# Patient Record
Sex: MALE | Race: WHITE | HISPANIC OR LATINO | Employment: FULL TIME | URBAN - METROPOLITAN AREA
[De-identification: names, ages, dates, MRNs, and addresses within clinical notes are randomized per-mention and may not be internally consistent; named-entity substitution may affect disease eponyms.]

---

## 2017-01-10 ENCOUNTER — OFFICE VISIT (OUTPATIENT)
Dept: NEUROLOGY | Facility: MEDICAL CENTER | Age: 52
End: 2017-01-10
Payer: COMMERCIAL

## 2017-01-10 VITALS
TEMPERATURE: 97.3 F | OXYGEN SATURATION: 98 % | WEIGHT: 150 LBS | HEIGHT: 65 IN | DIASTOLIC BLOOD PRESSURE: 78 MMHG | SYSTOLIC BLOOD PRESSURE: 104 MMHG | BODY MASS INDEX: 24.99 KG/M2 | HEART RATE: 74 BPM

## 2017-01-10 DIAGNOSIS — R51.9 CHRONIC DAILY HEADACHE: Primary | ICD-10-CM

## 2017-01-10 DIAGNOSIS — M54.2 NECK PAIN: ICD-10-CM

## 2017-01-10 PROCEDURE — 99205 OFFICE O/P NEW HI 60 MIN: CPT | Performed by: PHYSICIAN ASSISTANT

## 2017-01-10 RX ORDER — RIZATRIPTAN BENZOATE 10 MG/1
10 TABLET ORAL
Qty: 10 TAB | Refills: 3 | Status: SHIPPED | OUTPATIENT
Start: 2017-01-10 | End: 2022-08-05

## 2017-01-10 RX ORDER — PROMETHAZINE HYDROCHLORIDE 25 MG/1
TABLET ORAL
Qty: 20 TAB | Refills: 12 | Status: SHIPPED | OUTPATIENT
Start: 2017-01-10 | End: 2022-08-05

## 2017-01-10 RX ORDER — AMITRIPTYLINE HYDROCHLORIDE 10 MG/1
TABLET, FILM COATED ORAL
Qty: 100 TAB | Refills: 3 | Status: SHIPPED | OUTPATIENT
Start: 2017-01-10 | End: 2022-08-05

## 2017-01-10 RX ORDER — CYCLOBENZAPRINE HCL 10 MG
10 TABLET ORAL 3 TIMES DAILY PRN
Qty: 30 TAB | Refills: 0 | Status: SHIPPED | OUTPATIENT
Start: 2017-01-10 | End: 2022-08-05

## 2017-01-10 NOTE — PROGRESS NOTES
Subjective:      Mario Esquivel is a 51 y.o. male who presents with New Patient    Referring Physician  Elba Maddox MD    Chief Complaint/Reason for referral:  headaches    History of Present Illness:   Describe your headaches to me:  Through interpretor services - for a long time, especially at night when sleeping, he feels tightness in his neck. Wakes him up in the middle of the night with pain.  He describes pain as severe at times affecting his ability to work at times.  And then gives long tangential answer.  He was asked again to categorize his pain - sometimes the pain is mild and sometimes it is severe.  15-20 years history of headaches - he is coming to the doctor now because OTC medicines don't work as well anymore - he has to take a lot more of them.  Also ibuprofen is bothering his stomach now.    Extremely difficult to obtain history and patient seems incapable of simply answering questions without becoming extremely tangential.  Even with given options of 3 or 4 choices, he cannot say, for instance whether his headache is dull or pulsatile.  Sounds like it is mostly a throbbing headache, however.    Location of headaches:?  Sides of head and sometimes sides of head and shoulders and head.  Duration of headaches?  All day vs several hours pain, depending upon whether he takes medicines  When did headaches start or why do you think you started having headaches?  Have your headaches started recently or changed recently?     Do you get an aura or any symptoms that typically begin PRIOR to headache onset?  no    Are you nauseated or sick to your stomach when you have a headache?  yes  Does light bother you when you have a headache?   ___yes______  Does sound or noise bother you when you have a headache?   _____yes____    Do you have any neck or jaw pain with headaches? Pain does radiate to neck but when asked whether he is having problems with his teeth or seeing a dentist right now he states that he  is.  He is seeing a dentis right now for cavities, root canal and crown.     Have you identified any triggers for your headaches (dehydration, poor sleep, low blood sugar, alcohol 35% (link wine) chocolate 22%, cheese 9%, citrus fruit 11%)? Never identified any triggers.  He does have a headache after alcohol so he doesn't drink anymore    Do you feel restless like you want to pace around with your headaches or do you feel like lying down to make your headache feel better/less severe? Lie down    Do headaches start by coughing, sneezing, bending over, Valsalva maneuver, sexual activity? No     Do headaches start shortly after you lie down to go to bed or shortly after you get up from bed in the morning? Patient reports waking every day with splitting headache    Have you noticed a menstrual pattern to your headaches? Not applicable    Have you ever kept a headache diary? Never tried  How many days do you keep ANY type of headache in any given month?  3 or fewer days______   Between 3 and 6 days______   Between 6 and 10 days_____  Between 11 and 14 days____  15 or more headache/days per month__X___  Has severe headaches _5___ days per month    Family members with headaches: sister    Co--morbid conditions:    Conditions that affect diagnosis and treatment:  Depression  Yes - 3 to 4 years ago he was treated for depression with medication.  No longer taking medication at this time and denies problems at this time     Anxiety     No      Sleep disorders:   Yes - difficulty staying asleep because of headache      Obesity  N    History of TBI no            Pregnancy/family planning   Not applicable    Fibromyalgia   No    PMH reviewed:  Pertinent items include    What are you taking right now for your headaches:  excedrin  Ibuprofen  Sumatriptan (imitrex) - says it doesn't work but he isn't taking it when the headache starts.  He took it several times and says it didn't work.  He took just one pill.  He never took two  "pills or repeated dose.  Narcotic dose    #days per month of acute medication use:   Every day excedrin  Ibuprofen has been cut way back    Medications and Allergies Reviewed      Prior acute treatments:  Medication/dose/timing/route/worked or side-effects?  Prescription ibuprofen      Prior prophylactic treatments:  Medications/dose/frequency/duration of treatment/worked or side effects?  never    Social History:  Do you drink any caffeine? Yes__X___ No____   How many days per week?  2 or fewer days______  3 or more days__X____    Do you drink alcohol?  Rarely   Do you use recreational drugs including medicinal marijuana? denies    Do you smoke cigarettes? Denies    What do you do for work?     How do your headaches affect your ability to work? Over the past month he has missed work due to headache twice, his headaches affected his productivity at work or he went home early because of headache probably every day    Who and where do you live? Lives with his wife and two kids in [place]    What is your exercise program: no exercise program - too exhausted due to head and neck pain    Have you had an MRI done? none                                                                                     HPI    ROS       Objective:     /78 mmHg  Pulse 74  Temp(Src) 36.3 °C (97.3 °F)  Ht 1.651 m (5' 5\")  Wt 68.04 kg (150 lb)  BMI 24.96 kg/m2  SpO2 98%     Physical Exam   Constitutional: He is oriented to person, place, and time. He appears well-developed and well-nourished.   Eyes: EOM are normal.   Neck: Normal range of motion.   Pulmonary/Chest: Effort normal.   Neurological: He is alert and oriented to person, place, and time. He displays normal reflexes. No cranial nerve deficit. He exhibits normal muscle tone. Coordination normal.   Extremely tight trapezius muscles bilaterally with cerivcogenic tenderness   Skin: Skin is warm and dry.   Psychiatric:   Interpretor services hindered portions of exam "                 Assessment/Plan:     Migraine without Aura possibly positionally related:  Difficult to obtain history but patient is describing splitting headache that comes on every single night and wakes him every morning.  Rule out high or low pressure headache with MRI MRA head.  Maxalt, excedrin, fioricet for treatment with phenergan.  Chronic Migraine:  Begin taking elavil  Medication Overuse headache:  Counseled to restrict acute medications to 9 days monthly  Neck Pain:  Severe neck and shoulder pain and muscle tightness > than expected with chronic migraine.  MRI cervical spine to rule out headaches caused by neck pathology.    ONB would have been great option to relieve pain today but [employer] does not approve.    RTC 1 week after MRI/MRA completed    Total time with this visit: 60    Minutes face-to-face with patient. More than 50% of this visit was spent educating patient on their illness and/or coordinating care, as detailed above

## 2017-01-10 NOTE — PATIENT INSTRUCTIONS
MRI head and neck to be completed    To treat a headache that is happening:  Take maxalt at onset of headaches.  Reduce use of maxalt, excedrin and ibuprofen to maximum of 9 days per month.  OK to take maxalt and excedrin same day or same time.    Use phenergan if headache doesn't go away to put yourself to sleep.    To reduce the number of headaches you are getting:  Reduce maxalt, excedrin use to 9 days monthly to avoid medication overuse headaches  Begin taking and increasing daily medication at night - elavil (amitriptyline) to goal of 50 mg at bedtime.  This will reduce the frequency

## 2017-01-10 NOTE — MR AVS SNAPSHOT
"        Mario Esquivel   1/10/2017 10:00 AM   Office Visit   MRN: [id number]    Department:  [facility]   Dept Phone:  [phone]    Description:  Male : 1965   Provider:  Tricia Bryant PA-C           Reason for Visit     New Patient headaches      Allergies as of 1/10/2017     Allergen Noted Reactions    Omnipaque [Iohexol] 10/24/2016   Unspecified    Mild reaction occurred after injection.  Pt stated throat was \"sore\"   Assessed the pt but no medication was given.      You were diagnosed with     Chronic migraine   [082590]       Chronic daily headache   [011405]       Neck pain   [411346]         Vital Signs     Blood Pressure Pulse Temperature Height Weight Body Mass Index    104/78 mmHg 74 36.3 °C (97.3 °F) 1.651 m (5' 5\") 68.04 kg (150 lb) 24.96 kg/m2    Oxygen Saturation Smoking Status                98% Never Smoker           Basic Information     Date Of Birth Sex Race Ethnicity Preferred Language    1965 Male  or   Origin (Egyptian,Burundian,[place]uxembour[place], etc) English      Your appointments     2017  8:40 AM   Established Patient with Elba Maddox D.O.   [facility]    [address] [place] [id number] [phone]           You will be receiving a confirmation call a few days before your appointment from our automated call confirmation system.              Problem List              ICD-10-CM Priority Class Noted - Resolved    Pure hypercholesterolemia E78.00   2016 - Present    Chest tightness R07.89   2016 - Present    New daily persistent headache G44.52   2016 - Present    Testicular pain, left N50.812   2016 - Present    Right knee pain M25.561   2016 - Present    Plantar wart B07.0   2016 - Present    Chronic migraine G43.709   1/10/2017 - Present      Health Maintenance        Date Due Completion Dates    IMM DTaP/Tdap/Td Vaccine (1 - Tdap) 8/10/1986 ---    IMM INFLUENZA (1) " 9/1/2016 ---            Current Immunizations     No immunizations on file.      Below and/or attached are the medications your provider expects you to take. Review all of your home medications and newly ordered medications with your provider and/or pharmacist. Follow medication instructions as directed by your provider and/or pharmacist. Please keep your medication list with you and share with your provider. Update the information when medications are discontinued, doses are changed, or new medications (including over-the-counter products) are added; and carry medication information at all times in the event of emergency situations     Allergies:  OMNIPAQUE - Unspecified               Medications  Valid as of: January 10, 2017 - 11:00 AM    Generic Name Brand Name Tablet Size Instructions for use    Amitriptyline HCl (Tab) ELAVIL 10 MG 10 mg at bedtime.  Increase 10 mg every 7 days to target 50 mg bedtime in 5 weeks.        Butalbital-APAP-Caffeine (Tab) FIORICET -40 MG Take 1 Tab by mouth every four hours as needed for Headache.        Butalbital-APAP-Caffeine (Cap) FIORICET -40 MG TAKE 1 TABLET BY MOUTH EVERY FOUR HOURS AS NEEDED FOR HEADACHE.        Cyclobenzaprine HCl (Tab) FLEXERIL 5 MG TAKE 1 TABLET BY MOUTH 2 TIMES A DAY AS NEEDED FOR SEVERE PAIN.        Cyclobenzaprine HCl (Tab) FLEXERIL 10 MG Take 1 Tab by mouth 3 times a day as needed. Begin taking just one half tab at a time and monitor sleepiness and response of neck and shoulder tightness.        Ibuprofen (Tab) MOTRIN 800 MG Take 1 Tab by mouth every 8 hours as needed (with food).        Pravastatin Sodium (Tab) PRAVACHOL 10 MG Take 10 mg by mouth every evening.        Pravastatin Sodium (Tab) PRAVACHOL 20 MG Take 1 Tab by mouth every bedtime.        Promethazine HCl (Tab) PHENERGAN 25 MG 25 mg phenergan once on day with severe migraine.  Will cause sleepiness.  Do not drive.        Rizatriptan Benzoate (Tab) MAXALT 10 MG Take 1 Tab by  mouth Once PRN for Migraine for up to 1 dose.        .                 Medicines prescribed today were sent to:     [facility]/PHARMACY #[id number] - [place], [place] - [address]    [address] [place] [address]    Phone: [phone] Fax: [phone]    Open 24 Hours?: No      Medication refill instructions:       If your prescription bottle indicates you have medication refills left, it is not necessary to call your provider’s office. Please contact your pharmacy and they will refill your medication.    If your prescription bottle indicates you do not have any refills left, you may request refills at any time through one of the following ways: The online [employer] system (except Urgent Care), by calling your provider’s office, or by asking your pharmacy to contact your provider’s office with a refill request. Medication refills are processed only during regular business hours and may not be available until the next business day. Your provider may request additional information or to have a follow-up visit with you prior to refilling your medication.   *Please Note: Medication refills are assigned a new Rx number when refilled electronically. Your pharmacy may indicate that no refills were authorized even though a new prescription for the same medication is available at the pharmacy. Please request the medicine by name with the pharmacy before contacting your provider for a refill.        Your To Do List     Future Labs/Procedures Complete By Expires    MR-BRAIN-WITH & W/O  As directed 1/10/2018    MR-CERVICAL SPINE-WITH & W/O  As directed 1/10/2018      Instructions    MRI head and neck to be completed    To treat a headache that is happening:  Take maxalt at onset of headaches.  Reduce use of maxalt, excedrin and ibuprofen to maximum of 9 days per month.  OK to take maxalt and excedrin same day or same time.    Use phenergan if headache doesn't go away to put yourself to sleep.    To reduce the number of headaches you are  getting:  Reduce maxalt, excedrin use to 9 days monthly to avoid medication overuse headaches  Begin taking and increasing daily medication at night - elavil (amitriptyline) to goal of 50 mg at bedtime.  This will reduce the frequency          [employer] Access Code: [email or web address]  Expires: 1/18/2017  9:22 AM    [employer]  A secure, online tool to manage your health information     [employer]’s [employer]® is a secure, online tool that connects you to your personalized health information from the privacy of your home -- day or night - making it very easy for you to manage your healthcare. Once the activation process is completed, you can even access your medical information using the [employer] richa, which is available for free in the Apple Rihca store or Google Play store.     [employer] provides the following levels of access (as shown below):   My Chart Features   [facility] Primary Care Doctor [facility]  Specialists [facility]  Urgent  Care Non-[facility]  Primary Care  Doctor   Email your healthcare team securely and privately 24/7 X X X    Manage appointments: schedule your next appointment; view details of past/upcoming appointments X      Request prescription refills. X      View recent personal medical records, including lab and immunizations X X X X   View health record, including health history, allergies, medications X X X X   Read reports about your outpatient visits, procedures, consult and ER notes X X X X   See your discharge summary, which is a recap of your hospital and/or ER visit that includes your diagnosis, lab results, and care plan. X X       How to register for [employer]:  1. Go to  [email or web address].  2. Click on the Sign Up Now box, which takes you to the New Member Sign Up page. You will need to provide the following information:  a. Enter your [employer] Access Code exactly as it appears at the top of this page. (You will not need to use this code after you’ve completed the sign-up process. If you do not  sign up before the expiration date, you must request a new code.)   b. Enter your date of birth.   c. Enter your home email address.   d. Click Submit, and follow the next screen’s instructions.  3. Create a [employer] ID. This will be your [employer] login ID and cannot be changed, so think of one that is secure and easy to remember.  4. Create a [employer] password. You can change your password at any time.  5. Enter your Password Reset Question and Answer. This can be used at a later time if you forget your password.   6. Enter your e-mail address. This allows you to receive e-mail notifications when new information is available in [employer].  7. Click Sign Up. You can now view your health information.    For assistance activating your [employer] account, call [phone]

## 2017-02-09 ENCOUNTER — TELEPHONE (OUTPATIENT)
Dept: NEUROLOGY | Facility: MEDICAL CENTER | Age: 52
End: 2017-02-09

## 2017-02-09 NOTE — TELEPHONE ENCOUNTER
----- Message from Tricia Bryant PA-C sent at 2/8/2017  5:18 PM PST -----  Dimple  Call pt and advise that insurance will not authorize MRA and neck MRI.    Brittny - move forward with just MRI brain.  Thanks,  tricia  ----- Message -----     From: Brittny Moise     Sent: 2/8/2017  12:27 PM       To: MARK Bangura, my name is Brittny and I am in authorizations and am working on Mario. I was able to obtain the brain MRI  But not the other two (MRA & C-SPINE ) these require a peer to peer from you.this is what the insurance company is telling me.... The MRA is on hold as an MRI has not been done yet  Once done then the results will determine if the MRA is warranted . As for the neck: questions of PT, prior  treatment  need to be answered by you.  The patient is scheduled for Monday, and I had to put these  requests under Dr. Bloch as you do not come up in their system.    So again we have the auth for the Brain MRI and can go ahead with this and should you decide to do a peer to peer to help get the other two exams approved,the number to call is     [phone] follow the promp for doctors    Patient insurance id # is [id number]     for MRA [id number]   For C-spine mri [id number]   please let me know what happens    Thank you for your help with this   Brittny Moise  [phone]

## 2017-02-13 ENCOUNTER — HOSPITAL ENCOUNTER (OUTPATIENT)
Dept: RADIOLOGY | Facility: MEDICAL CENTER | Age: 52
End: 2017-02-13
Attending: PHYSICIAN ASSISTANT
Payer: COMMERCIAL

## 2017-02-13 DIAGNOSIS — R51.9 CHRONIC DAILY HEADACHE: ICD-10-CM

## 2017-02-13 PROCEDURE — 700117 HCHG RX CONTRAST REV CODE 255: Performed by: PHYSICIAN ASSISTANT

## 2017-02-13 PROCEDURE — A9579 GAD-BASE MR CONTRAST NOS,1ML: HCPCS | Performed by: PHYSICIAN ASSISTANT

## 2017-02-13 PROCEDURE — 70553 MRI BRAIN STEM W/O & W/DYE: CPT

## 2017-02-13 RX ADMIN — GADODIAMIDE 15 ML: 287 INJECTION INTRAVENOUS at 08:18

## 2017-03-24 ENCOUNTER — TELEPHONE (OUTPATIENT)
Dept: NEUROLOGY | Facility: MEDICAL CENTER | Age: 52
End: 2017-03-24

## 2017-03-24 NOTE — TELEPHONE ENCOUNTER
1.  Minimal supratentorial white matter disease most consistent with microvascular ischemic change versus demyelination or gliosis. Common findings for the patient's age.  2.  No evidence of acute cerebral infarction, hemorrhage, or mass lesion.    Please call pt and advise - #1 is typically in seen from high blood pressure, smoking, migraine headaches, or similar stress put on smallest blood vessels and is not of particular concern except to manage blood pressure, avoid smoking, etc.    #2 - no acute stroke, bleeding in the brain or brain tumor.    Thanks, peg

## 2017-03-31 ENCOUNTER — HOSPITAL ENCOUNTER (OUTPATIENT)
Dept: RADIOLOGY | Facility: MEDICAL CENTER | Age: 52
End: 2017-03-31
Attending: FAMILY MEDICINE
Payer: COMMERCIAL

## 2017-03-31 ENCOUNTER — OFFICE VISIT (OUTPATIENT)
Dept: MEDICAL GROUP | Facility: PHYSICIAN GROUP | Age: 52
End: 2017-03-31
Payer: COMMERCIAL

## 2017-03-31 VITALS
HEART RATE: 80 BPM | TEMPERATURE: 97.5 F | OXYGEN SATURATION: 98 % | WEIGHT: 154 LBS | DIASTOLIC BLOOD PRESSURE: 78 MMHG | SYSTOLIC BLOOD PRESSURE: 112 MMHG | HEIGHT: 65 IN | BODY MASS INDEX: 25.66 KG/M2

## 2017-03-31 DIAGNOSIS — M25.471 RIGHT ANKLE SWELLING: ICD-10-CM

## 2017-03-31 DIAGNOSIS — M25.561 CHRONIC PAIN OF RIGHT KNEE: ICD-10-CM

## 2017-03-31 DIAGNOSIS — G89.29 CHRONIC PAIN OF RIGHT KNEE: ICD-10-CM

## 2017-03-31 PROCEDURE — 99214 OFFICE O/P EST MOD 30 MIN: CPT | Performed by: FAMILY MEDICINE

## 2017-03-31 PROCEDURE — 73600 X-RAY EXAM OF ANKLE: CPT | Mod: RT

## 2017-03-31 NOTE — MR AVS SNAPSHOT
"        Mario Esquivel   3/31/2017 4:40 PM   Office Visit   MRN: [id number]    Department:  [facility]   Dept Phone:  [phone]    Description:  Male : 1965   Provider:  Elba Maddox D.O.           Reason for Visit     Follow-Up           Allergies as of 3/31/2017     Allergen Noted Reactions    Omnipaque [Iohexol] 10/24/2016   Unspecified    Mild reaction occurred after injection.  Pt stated throat was \"sore\"   Assessed the pt but no medication was given.      You were diagnosed with     Chronic pain of right knee   [4083575]       Right ankle swelling   [565063]       Chronic migraine   [303500]         Vital Signs     Blood Pressure Pulse Temperature Height Weight Body Mass Index    112/78 mmHg 80 36.4 °C (97.5 °F) 1.651 m (5' 5\") 69.854 kg (154 lb) 25.63 kg/m2    Oxygen Saturation Smoking Status                98% Never Smoker           Basic Information     Date Of Birth Sex Race Ethnicity Preferred Language    1965 Male  or   Origin (Kazakh,Mosotho,Guatemalan,Citizen of [place], etc) English      Problem List              ICD-10-CM Priority Class Noted - Resolved    Pure hypercholesterolemia E78.00   2016 - Present    Chest tightness R07.89   2016 - Present    New daily persistent headache G44.52   2016 - Present    Testicular pain, left N50.812   2016 - Present    Right knee pain M25.561   2016 - Present    Plantar wart B07.0   2016 - Present    Chronic migraine G43.709   1/10/2017 - Present    Right ankle swelling M25.471   3/31/2017 - Present      Health Maintenance        Date Due Completion Dates    IMM DTaP/Tdap/Td Vaccine (1 - Tdap) 8/10/1986 ---    IMM INFLUENZA (1) 2016 ---            Current Immunizations     No immunizations on file.      Below and/or attached are the medications your provider expects you to take. Review all of your home medications and newly ordered medications with your provider and/or pharmacist. Follow " medication instructions as directed by your provider and/or pharmacist. Please keep your medication list with you and share with your provider. Update the information when medications are discontinued, doses are changed, or new medications (including over-the-counter products) are added; and carry medication information at all times in the event of emergency situations     Allergies:  OMNIPAQUE - Unspecified               Medications  Valid as of: March 31, 2017 -  5:33 PM    Generic Name Brand Name Tablet Size Instructions for use    Amitriptyline HCl (Tab) ELAVIL 10 MG 10 mg at bedtime.  Increase 10 mg every 7 days to target 50 mg bedtime in 5 weeks.        Butalbital-APAP-Caffeine (Tab) FIORICET -40 MG Take 1 Tab by mouth every four hours as needed for Headache.        Butalbital-APAP-Caffeine (Cap) FIORICET -40 MG TAKE 1 TABLET BY MOUTH EVERY FOUR HOURS AS NEEDED FOR HEADACHE.        Cyclobenzaprine HCl (Tab) FLEXERIL 5 MG TAKE 1 TABLET BY MOUTH 2 TIMES A DAY AS NEEDED FOR SEVERE PAIN.        Cyclobenzaprine HCl (Tab) FLEXERIL 10 MG Take 1 Tab by mouth 3 times a day as needed. Begin taking just one half tab at a time and monitor sleepiness and response of neck and shoulder tightness.        Ibuprofen (Tab) MOTRIN 800 MG Take 1 Tab by mouth every 8 hours as needed (with food).        Pravastatin Sodium (Tab) PRAVACHOL 10 MG Take 10 mg by mouth every evening.        Pravastatin Sodium (Tab) PRAVACHOL 20 MG Take 1 Tab by mouth every bedtime.        Promethazine HCl (Tab) PHENERGAN 25 MG 25 mg phenergan once on day with severe migraine.  Will cause sleepiness.  Do not drive.        Rizatriptan Benzoate (Tab) MAXALT 10 MG Take 1 Tab by mouth Once PRN for Migraine for up to 1 dose.        .                 Medicines prescribed today were sent to:     [facility]/PHARMACY #[id number] - [place] - [address]    [address] [place] [address]    Phone: [phone] Fax: [phone]    Open 24 Hours?: No         Medication refill instructions:       If your prescription bottle indicates you have medication refills left, it is not necessary to call your provider’s office. Please contact your pharmacy and they will refill your medication.    If your prescription bottle indicates you do not have any refills left, you may request refills at any time through one of the following ways: The online [employer] system (except Urgent Care), by calling your provider’s office, or by asking your pharmacy to contact your provider’s office with a refill request. Medication refills are processed only during regular business hours and may not be available until the next business day. Your provider may request additional information or to have a follow-up visit with you prior to refilling your medication.   *Please Note: Medication refills are assigned a new Rx number when refilled electronically. Your pharmacy may indicate that no refills were authorized even though a new prescription for the same medication is available at the pharmacy. Please request the medicine by name with the pharmacy before contacting your provider for a refill.        Your To Do List     Future Labs/Procedures Complete By Expires    DX-ANKLE 2- VIEWS RIGHT  As directed 3/31/2018    MR-KNEE-W/O RIGHT  As directed 10/1/2017         [employer] Status: Patient Declined

## 2017-04-01 NOTE — ASSESSMENT & PLAN NOTE
Ongoing issue. Patient reports that he has been seen by the neurologist and started on new medications. When asked, patient states that he has not been consistent with taking the medications and continues to have headaches.

## 2017-04-01 NOTE — ASSESSMENT & PLAN NOTE
New problem. Patient states that 3 days ago he started noticing pain and swelling in his right ankle. Patient currently is denying any issues with trauma associated with this new finding. He states that the pain is achy; persistent; associated with swelling. He is is not taking anything for the pain.

## 2017-04-01 NOTE — ASSESSMENT & PLAN NOTE
Ongoing issue. Patient reports he continues to have pain in the right knee becomes ago; occasionally sharp in nature.. Patient also reports that now he feels like there is some instability in the knee. Along with some swelling. X-rays reviewed showed mild arthritis.

## 2017-04-01 NOTE — PROGRESS NOTES
Subjective:   Mario Esquivel is a 51 y.o. male here today for knee pain; ankle swelling; migraine    Right knee pain  Ongoing issue. Patient reports he continues to have pain in the right knee becomes ago; occasionally sharp in nature.. Patient also reports that now he feels like there is some instability in the knee. Along with some swelling. X-rays reviewed showed mild arthritis.    Right ankle swelling  New problem. Patient states that 3 days ago he started noticing pain and swelling in his right ankle. Patient currently is denying any issues with trauma associated with this new finding. He states that the pain is achy; persistent; associated with swelling. He is is not taking anything for the pain.    Chronic migraine  Ongoing issue. Patient reports that he has been seen by the neurologist and started on new medications. When asked, patient states that he has not been consistent with taking the medications and continues to have headaches.         Current medicines (including changes today)  Current Outpatient Prescriptions   Medication Sig Dispense Refill   • rizatriptan (MAXALT) 10 MG tablet Take 1 Tab by mouth Once PRN for Migraine for up to 1 dose. 10 Tab 3   • amitriptyline (ELAVIL) 10 MG Tab 10 mg at bedtime.  Increase 10 mg every 7 days to target 50 mg bedtime in 5 weeks. 100 Tab 3   • promethazine (PHENERGAN) 25 MG Tab 25 mg phenergan once on day with severe migraine.  Will cause sleepiness.  Do not drive. 20 Tab 12   • cyclobenzaprine (FLEXERIL) 10 MG Tab Take 1 Tab by mouth 3 times a day as needed. Begin taking just one half tab at a time and monitor sleepiness and response of neck and shoulder tightness. 30 Tab 0   • acetaminophen/caffeine/butalbital 300-40-50 mg (FIORICET) -40 MG Cap capsule TAKE 1 TABLET BY MOUTH EVERY FOUR HOURS AS NEEDED FOR HEADACHE. 30 Cap 0   • pravastatin (PRAVACHOL) 20 MG Tab Take 1 Tab by mouth every bedtime. 30 Tab 11   • cyclobenzaprine (FLEXERIL) 5 MG tablet TAKE 1  "TABLET BY MOUTH 2 TIMES A DAY AS NEEDED FOR SEVERE PAIN. 30 Tab 0   • acetaminophen/caffeine/butalbital 325-40-50 mg (FIORICET) -40 MG Tab Take 1 Tab by mouth every four hours as needed for Headache. 30 Tab 0   • pravastatin (PRAVACHOL) 10 MG Tab Take 10 mg by mouth every evening.     • ibuprofen (MOTRIN) 800 MG Tab Take 1 Tab by mouth every 8 hours as needed (with food). 30 Tab 1     No current facility-administered medications for this visit.     He  has a past medical history of Pure hypercholesterolemia (8/26/2016); Arthritis; and Headache. He also has no past medical history of Hypertension.    ROS   No chest pain, no shortness of breath, no abdominal pain  +right knee pain; ankle swelling; HA     Objective:     Blood pressure 112/78, pulse 80, temperature 36.4 °C (97.5 °F), height 1.651 m (5' 5\"), weight 69.854 kg (154 lb), SpO2 98 %. Body mass index is 25.63 kg/(m^2).   Physical Exam:  Alert, oriented in no acute distress.  Eye contact is good, speech goal directed, affect calm  HEENT: conjunctiva non-injected, sclera non-icteric.  Pinna normal. Oral mucous membranes pink and moist with no lesions.  Neck No adenopathy or masses in the neck or supraclavicular regions.  Lungs: clear to auscultation bilaterally with good excursion.  CV: regular rate and rhythm.  Abdomen: soft, nontender, No CVAT  Ext: no edema, color normal, vascularity normal, temperature normal  Neuro: CN 2-12 grossly intact  MSK: right knee - no swelling/TTP/erythema; noted mild crepitus on flexion/extension            Right ankle - 2+swelling, no erythema/TTP; bruising noted along the bottom edge of both sides of the ankle      Assessment and Plan:   The following treatment plan was discussed     1. Chronic pain of right knee  MR-KNEE-W/O RIGHT    Uncontrolled. MRI has been ordered; monitor for results   2. Right ankle swelling  DX-ANKLE 2- VIEWS RIGHT    Uncontrolled; unknown origin. Sent for x-rays and monitor for results   3. " Chronic migraine      Uncontrolled. Recommend patient take medication as prescribed by the neurologist; follow-up with neurology.       Followup: Return if symptoms worsen or fail to improve.

## 2017-04-04 ENCOUNTER — TELEPHONE (OUTPATIENT)
Dept: MEDICAL GROUP | Facility: PHYSICIAN GROUP | Age: 52
End: 2017-04-04

## 2017-04-04 NOTE — TELEPHONE ENCOUNTER
----- Message from Elba Maddox D.O. sent at 4/3/2017 12:01 PM PDT -----  Please advise pt that the ankle xray did not show any bone damage but does show significant swelling.  Would recommend ice pack to the ankle for 20 minutes twice daily for one week; also use over the counter ibuprofen 400 mg twice daily for the pain.    Elba Maddox D.O.

## 2017-04-17 RX ORDER — IBUPROFEN 800 MG/1
TABLET ORAL
Qty: 30 TAB | Refills: 3 | Status: SHIPPED | OUTPATIENT
Start: 2017-04-17 | End: 2018-08-21 | Stop reason: SDUPTHER

## 2018-07-11 ENCOUNTER — OFFICE VISIT (OUTPATIENT)
Dept: MEDICAL GROUP | Facility: PHYSICIAN GROUP | Age: 53
End: 2018-07-11
Payer: COMMERCIAL

## 2018-07-11 VITALS
WEIGHT: 158 LBS | TEMPERATURE: 99.1 F | HEIGHT: 65 IN | BODY MASS INDEX: 26.33 KG/M2 | DIASTOLIC BLOOD PRESSURE: 84 MMHG | SYSTOLIC BLOOD PRESSURE: 110 MMHG | OXYGEN SATURATION: 95 % | HEART RATE: 82 BPM | RESPIRATION RATE: 12 BRPM

## 2018-07-11 DIAGNOSIS — E78.00 PURE HYPERCHOLESTEROLEMIA: ICD-10-CM

## 2018-07-11 DIAGNOSIS — R19.07 GENERALIZED ABDOMINAL MASS: ICD-10-CM

## 2018-07-11 DIAGNOSIS — R07.89 CHEST TIGHTNESS: ICD-10-CM

## 2018-07-11 DIAGNOSIS — R35.0 URINARY FREQUENCY: ICD-10-CM

## 2018-07-11 PROBLEM — E78.5 DYSLIPIDEMIA: Status: ACTIVE | Noted: 2018-07-11

## 2018-07-11 PROBLEM — L29.9 ITCHY SKIN: Status: ACTIVE | Noted: 2018-07-11

## 2018-07-11 PROBLEM — E78.5 DYSLIPIDEMIA: Status: RESOLVED | Noted: 2018-07-11 | Resolved: 2018-07-11

## 2018-07-11 PROBLEM — L29.9 ITCHY SKIN: Status: RESOLVED | Noted: 2018-07-11 | Resolved: 2018-07-11

## 2018-07-11 PROCEDURE — 99214 OFFICE O/P EST MOD 30 MIN: CPT | Performed by: FAMILY MEDICINE

## 2018-07-11 ASSESSMENT — PATIENT HEALTH QUESTIONNAIRE - PHQ9
CLINICAL INTERPRETATION OF PHQ2 SCORE: 2
SUM OF ALL RESPONSES TO PHQ QUESTIONS 1-9: 7
5. POOR APPETITE OR OVEREATING: 0 - NOT AT ALL

## 2018-07-11 NOTE — ASSESSMENT & PLAN NOTE
"This problem is new to me.  Patient is reporting today that he has \"little lumps\" on multiple places of his abdomen.  He states that they have been present for several months; have not changed in size; are not painful but does cause itchy skin.  Nothing makes it better or worse; he has no associated symptoms such as nausea, vomiting, diarrhea, constipation.  "

## 2018-07-11 NOTE — ASSESSMENT & PLAN NOTE
This problem is new to me.  Again in the course of conversation today he suddenly reports that the reason he quit taking his pravastatin on a daily basis was that he felt it was causing an increase in urinary frequency.  When asked about the urinary frequency patient is unable to again explain to me how frequently he is having urination; he does deny any pain or gross hematuria with the urination.  He denies any fevers or chills.  He denies any penile lesions or any drainage.  He does not recall if by stopping the medication is actually improved the urinary frequency

## 2018-07-11 NOTE — PROGRESS NOTES
"Subjective:   Mario Esquivel is a 52 y.o. male here today for chest tightness; urinary frequency; elevated lipids; abdominal masses    Pure hypercholesterolemia  Ongoing issues; patient reports today that he has not been taking his medication consistently.  Upon further questioning, he does not recall if he is taking the 10 mg or 20 mg of the pravastatin when he does take his medication.  Review of chart shows that he has not had blood work done in almost a year and a half.  When confronted with this information patient seems unfazed and is unaware as to why I will refill 1 of the prescriptions since he is out of both of them.    Chest tightness  Ongoing issue.  Patient is reporting again that he is having what he calls \"chest tightness\".  He states with the chest tightness he occasionally has shortness of breath.  He is unable to explain to me on what occasions that this will typically occur; he cannot tell me how long it typically last; he denies any associated symptoms though when this occurs.  When I asked him if this is new or different from his previous complaint he states now it is not.  I asked him how many times he has had this complaint and he does not recall how frequently it is occurring.    Urinary frequency  This problem is new to me.  Again in the course of conversation today he suddenly reports that the reason he quit taking his pravastatin on a daily basis was that he felt it was causing an increase in urinary frequency.  When asked about the urinary frequency patient is unable to again explain to me how frequently he is having urination; he does deny any pain or gross hematuria with the urination.  He denies any fevers or chills.  He denies any penile lesions or any drainage.  He does not recall if by stopping the medication is actually improved the urinary frequency    Generalized abdominal mass  This problem is new to me.  Patient is reporting today that he has \"little lumps\" on multiple places " of his abdomen.  He states that they have been present for several months; have not changed in size; are not painful but does cause itchy skin.  Nothing makes it better or worse; he has no associated symptoms such as nausea, vomiting, diarrhea, constipation.         Current medicines (including changes today)  Current Outpatient Prescriptions   Medication Sig Dispense Refill   • pravastatin (PRAVACHOL) 20 MG Tab TAKE 1 TAB BY MOUTH EVERY BEDTIME. 90 Tab 0   • ibuprofen (MOTRIN) 800 MG Tab TAKE 1 TAB BY MOUTH EVERY 8 HOURS AS NEEDED (WITH FOOD). 30 Tab 3   • cyclobenzaprine (FLEXERIL) 5 MG tablet TAKE 1 TABLET BY MOUTH 2 TIMES A DAY AS NEEDED FOR SEVERE PAIN. 30 Tab 3   • rizatriptan (MAXALT) 10 MG tablet Take 1 Tab by mouth Once PRN for Migraine for up to 1 dose. 10 Tab 3   • amitriptyline (ELAVIL) 10 MG Tab 10 mg at bedtime.  Increase 10 mg every 7 days to target 50 mg bedtime in 5 weeks. 100 Tab 3   • promethazine (PHENERGAN) 25 MG Tab 25 mg phenergan once on day with severe migraine.  Will cause sleepiness.  Do not drive. 20 Tab 12   • cyclobenzaprine (FLEXERIL) 10 MG Tab Take 1 Tab by mouth 3 times a day as needed. Begin taking just one half tab at a time and monitor sleepiness and response of neck and shoulder tightness. 30 Tab 0   • acetaminophen/caffeine/butalbital 300-40-50 mg (FIORICET) -40 MG Cap capsule TAKE 1 TABLET BY MOUTH EVERY FOUR HOURS AS NEEDED FOR HEADACHE. 30 Cap 0   • acetaminophen/caffeine/butalbital 325-40-50 mg (FIORICET) -40 MG Tab Take 1 Tab by mouth every four hours as needed for Headache. 30 Tab 0   • pravastatin (PRAVACHOL) 10 MG Tab Take 10 mg by mouth every evening.       No current facility-administered medications for this visit.      He  has a past medical history of Arthritis; Headache; and Pure hypercholesterolemia (8/26/2016). He also has no past medical history of Hypertension.    ROS   No chest pain, no shortness of breath, no abdominal pain       Objective:  "    Blood pressure 110/84, pulse 82, temperature 37.3 °C (99.1 °F), resp. rate 12, height 1.651 m (5' 5\"), weight 71.7 kg (158 lb), SpO2 95 %. Body mass index is 26.29 kg/m².   Physical Exam:  Alert, oriented in no acute distress.  Eye contact is good, speech goal directed, affect calm  HEENT: conjunctiva non-injected, sclera non-icteric.  Pinna normal. TM pearly gray.   Oral mucous membranes pink and moist with no lesions.  Neck No adenopathy or masses in the neck or supraclavicular regions.  Lungs: clear to auscultation bilaterally with good excursion.  CV: regular rate and rhythm.  Abdomen: soft, nontender, No CVAT; small, soft, mobile masses appreciated in multiple areas just underneath the skin in the abdominal area in the right upper quadrant, left upper quadrant, left lower quadrant- nontender to palpation  Ext: no edema, color normal, vascularity normal, temperature normal        Assessment and Plan:   The following treatment plan was discussed   1. Chest tightness      Uncontrolled; unknown origin.  DDX includes ACS, costochondritis, GERD, asthma, COPD, idiopathic.  Recommend patient go to ER for evaluation but he declined   2. Pure hypercholesterolemia  COMP METABOLIC PANEL    CBC WITH DIFFERENTIAL    LIPID PROFILE    TSH    VITAMIN D,25 HYDROXY    Uncontrolled; patient noncompliant with medication and with regular follow-up for laboratory evaluation; labs ordered for evaluation   3. Urinary frequency  PROSTATE SPECIFIC AG DIAGNOSTIC    Uncontrolled; unknown origin; DDX includes BPH, infection, idiopathic.  Labs ordered for further evaluation; monitor for results   4. Generalized abdominal mass  CT-ABDOMEN-PELVIS W/O    Unknown origin; DDX includes sebaceous cyst, lipoma, sarcoid, sarcoma, idiopathic ;CT ordered for further evaluation.  Monitor for results       Followup: Return in about 6 weeks (around 8/22/2018) for chest pain/lab review/abdominal masses - does not speak english.            "

## 2018-07-11 NOTE — ASSESSMENT & PLAN NOTE
"Ongoing issue.  Patient is reporting again that he is having what he calls \"chest tightness\".  He states with the chest tightness he occasionally has shortness of breath.  He is unable to explain to me on what occasions that this will typically occur; he cannot tell me how long it typically last; he denies any associated symptoms though when this occurs.  When I asked him if this is new or different from his previous complaint he states now it is not.  I asked him how many times he has had this complaint and he does not recall how frequently it is occurring.  "

## 2018-07-23 ENCOUNTER — HOSPITAL ENCOUNTER (OUTPATIENT)
Dept: RADIOLOGY | Facility: MEDICAL CENTER | Age: 53
End: 2018-07-23
Attending: FAMILY MEDICINE
Payer: COMMERCIAL

## 2018-07-23 DIAGNOSIS — R19.07 GENERALIZED ABDOMINAL MASS: ICD-10-CM

## 2018-07-23 PROCEDURE — 74176 CT ABD & PELVIS W/O CONTRAST: CPT

## 2018-07-23 PROCEDURE — 700117 HCHG RX CONTRAST REV CODE 255: Performed by: FAMILY MEDICINE

## 2018-07-23 RX ADMIN — IOHEXOL 50 ML: 240 INJECTION, SOLUTION INTRATHECAL; INTRAVASCULAR; INTRAVENOUS; ORAL at 17:14

## 2018-08-21 ENCOUNTER — OFFICE VISIT (OUTPATIENT)
Dept: MEDICAL GROUP | Facility: PHYSICIAN GROUP | Age: 53
End: 2018-08-21
Payer: COMMERCIAL

## 2018-08-21 VITALS
OXYGEN SATURATION: 96 % | WEIGHT: 155 LBS | HEIGHT: 65 IN | SYSTOLIC BLOOD PRESSURE: 122 MMHG | TEMPERATURE: 99.1 F | BODY MASS INDEX: 25.83 KG/M2 | DIASTOLIC BLOOD PRESSURE: 78 MMHG | HEART RATE: 72 BPM

## 2018-08-21 DIAGNOSIS — G89.29 CHRONIC MIDLINE LOW BACK PAIN WITHOUT SCIATICA: ICD-10-CM

## 2018-08-21 DIAGNOSIS — D17.1 LIPOMA OF TORSO: ICD-10-CM

## 2018-08-21 DIAGNOSIS — M54.50 CHRONIC MIDLINE LOW BACK PAIN WITHOUT SCIATICA: ICD-10-CM

## 2018-08-21 DIAGNOSIS — E78.00 PURE HYPERCHOLESTEROLEMIA: ICD-10-CM

## 2018-08-21 DIAGNOSIS — M17.0 PRIMARY OSTEOARTHRITIS OF BOTH KNEES: ICD-10-CM

## 2018-08-21 PROCEDURE — 99214 OFFICE O/P EST MOD 30 MIN: CPT | Performed by: FAMILY MEDICINE

## 2018-08-21 RX ORDER — PRAVASTATIN SODIUM 20 MG
20 TABLET ORAL
Qty: 90 TAB | Refills: 3 | Status: SHIPPED | OUTPATIENT
Start: 2018-08-21 | End: 2022-08-05

## 2018-08-21 RX ORDER — IBUPROFEN 800 MG/1
TABLET ORAL
Qty: 60 TAB | Refills: 1 | Status: SHIPPED | OUTPATIENT
Start: 2018-08-21 | End: 2018-11-05 | Stop reason: SDUPTHER

## 2018-08-21 RX ORDER — CYCLOBENZAPRINE HCL 5 MG
TABLET ORAL
Qty: 60 TAB | Refills: 1 | Status: SHIPPED | OUTPATIENT
Start: 2018-08-21 | End: 2022-08-05

## 2018-08-22 NOTE — ASSESSMENT & PLAN NOTE
Ongoing issue.  Patient is reporting he continues to have headache on a fairly regular basis.  As it has been described in previous occasions, patient continues to report pain at the base of the skull that radiates up the back of the head.  Patient was referred to neurology but has not followed up with him.  He was prescribed medication but does not remember the name.  Medications that are currently in his chart have been reviewed with him but he does not recall that any of those sound familiar.  He denies any specific symptoms except for the pain associated with the headache.

## 2018-08-22 NOTE — ASSESSMENT & PLAN NOTE
Ongoing issue.  Recent labs have been reviewed in detail with the patient today which shows an elevated total cholesterol and LDL cholesterol.  When asked, patient states that he has not been taking his cholesterol medicine and does not remember the last time he did take it.  He has not followed any specific eating plan nor does he get any regular daily exercise.

## 2018-08-22 NOTE — PROGRESS NOTES
"Subjective:   Mario Esquivel is a 53 y.o. male here today for elevated lipids; migraine; lipoma; back pain; knee pain    Pure hypercholesterolemia  Ongoing issue.  Recent labs have been reviewed in detail with the patient today which shows an elevated total cholesterol and LDL cholesterol.  When asked, patient states that he has not been taking his cholesterol medicine and does not remember the last time he did take it.  He has not followed any specific eating plan nor does he get any regular daily exercise.    Chronic migraine  Ongoing issue.  Patient is reporting he continues to have headache on a fairly regular basis.  As it has been described in previous occasions, patient continues to report pain at the base of the skull that radiates up the back of the head.  Patient was referred to neurology but has not followed up with him.  He was prescribed medication but does not remember the name.  Medications that are currently in his chart have been reviewed with him but he does not recall that any of those sound familiar.  He denies any specific symptoms except for the pain associated with the headache.    Lipoma of torso  Ongoing issue.  At his previous appointment patient was complaining of \"lesions\" over the trunk.  CT of the abdomen and pelvis was ordered to help determine pain along with these \"lumps\".  CT scan was negative.  Based on description, these appear to be lipomas.  They are not causing any specific pain; pain is not persistent; pain does not radiate.  Patient wants them removed; I have explained to the patient that that is a surgical procedure.    Chronic midline low back pain without sciatica  Ongoing issue; patient continues to report low back pain.  He states that the pain comes and goes; is describing it as aching in nature.  He denies any specific radiation of the pain.  He denies any numbness; denies any saddle anesthesia.  He has had previous trauma to the low back but this was several years " ago.    Primary osteoarthritis of both knees  Ongoing issue.  Patient continues to complain of pain in both knees.  Pain is aching in nature does have some associated swelling.  Pain can be worse with activity and better with rest.  He is not taking anything specifically for the pain.     MA present to interpret during the visit since patient does not speak English.    Current medicines (including changes today)  Current Outpatient Prescriptions   Medication Sig Dispense Refill   • ibuprofen (MOTRIN) 800 MG Tab TAKE 1 TAB BY MOUTH EVERY 8 HOURS AS NEEDED (WITH FOOD). 60 Tab 1   • cyclobenzaprine (FLEXERIL) 5 MG tablet TAKE 1 TABLET BY MOUTH 2 TIMES A DAY AS NEEDED FOR SEVERE PAIN. 60 Tab 1   • pravastatin (PRAVACHOL) 20 MG Tab Take 1 Tab by mouth every bedtime. 90 Tab 3   • rizatriptan (MAXALT) 10 MG tablet Take 1 Tab by mouth Once PRN for Migraine for up to 1 dose. 10 Tab 3   • amitriptyline (ELAVIL) 10 MG Tab 10 mg at bedtime.  Increase 10 mg every 7 days to target 50 mg bedtime in 5 weeks. 100 Tab 3   • promethazine (PHENERGAN) 25 MG Tab 25 mg phenergan once on day with severe migraine.  Will cause sleepiness.  Do not drive. 20 Tab 12   • cyclobenzaprine (FLEXERIL) 10 MG Tab Take 1 Tab by mouth 3 times a day as needed. Begin taking just one half tab at a time and monitor sleepiness and response of neck and shoulder tightness. 30 Tab 0   • acetaminophen/caffeine/butalbital 300-40-50 mg (FIORICET) -40 MG Cap capsule TAKE 1 TABLET BY MOUTH EVERY FOUR HOURS AS NEEDED FOR HEADACHE. 30 Cap 0   • acetaminophen/caffeine/butalbital 325-40-50 mg (FIORICET) -40 MG Tab Take 1 Tab by mouth every four hours as needed for Headache. 30 Tab 0     No current facility-administered medications for this visit.      He  has a past medical history of Arthritis; Headache; and Pure hypercholesterolemia (8/26/2016). He also has no past medical history of Hypertension.    ROS   No chest pain, no shortness of breath, no  "abdominal pain  + Headache, back pain, knee pain     Objective:     Blood pressure 122/78, pulse 72, temperature 37.3 °C (99.1 °F), height 1.651 m (5' 5\"), weight 70.3 kg (155 lb), SpO2 96 %. Body mass index is 25.79 kg/m².   Physical Exam:  Alert, oriented in no acute distress.  Eye contact is good, speech goal directed, affect calm  HEENT: conjunctiva non-injected, sclera non-icteric.  Pinna normal. Oral mucous membranes pink and moist with no lesions.  Neck No adenopathy or masses in the neck or supraclavicular regions.  Lungs: clear to auscultation bilaterally with good excursion.  CV: regular rate and rhythm.  Abdomen: soft, nontender, No CVAT  Ext: no edema, color normal, vascularity normal, temperature normal        Assessment and Plan:   The following treatment plan was discussed   1. Pure hypercholesterolemia  LIPID PROFILE    COMP METABOLIC PANEL    Uncontrolled; prescription for pravastatin 20 mg daily; recheck labs in 3 months   2. Chronic migraine      Stable.  Patient will let me know what medication the neurologist prescribed so that he can get new refills; monitor   3. Lipoma of torso      Stable.  Patient declined referral to surgery; monitor   4. Chronic midline low back pain without sciatica      Uncontrolled; patient declined referral to physical therapy; prescription for Flexeril and ibuprofen provided   5. Primary osteoarthritis of both knees      Uncontrolled; patient declined referral to physical therapy; prescription for ibuprofen provided       Followup: Return in about 3 months (around 11/21/2018) for lab/medication review/back pain/lipids/HA - does not speak english, Long.            "

## 2018-08-22 NOTE — ASSESSMENT & PLAN NOTE
Ongoing issue.  Patient continues to complain of pain in both knees.  Pain is aching in nature does have some associated swelling.  Pain can be worse with activity and better with rest.  He is not taking anything specifically for the pain.

## 2018-08-22 NOTE — ASSESSMENT & PLAN NOTE
Ongoing issue; patient continues to report low back pain.  He states that the pain comes and goes; is describing it as aching in nature.  He denies any specific radiation of the pain.  He denies any numbness; denies any saddle anesthesia.  He has had previous trauma to the low back but this was several years ago.

## 2018-08-22 NOTE — ASSESSMENT & PLAN NOTE
"Ongoing issue.  At his previous appointment patient was complaining of \"lesions\" over the trunk.  CT of the abdomen and pelvis was ordered to help determine pain along with these \"lumps\".  CT scan was negative.  Based on description, these appear to be lipomas.  They are not causing any specific pain; pain is not persistent; pain does not radiate.  Patient wants them removed; I have explained to the patient that that is a surgical procedure.  "

## 2018-11-06 RX ORDER — IBUPROFEN 800 MG/1
TABLET ORAL
Qty: 60 TAB | Refills: 0 | Status: SHIPPED | OUTPATIENT
Start: 2018-11-06 | End: 2022-08-05

## 2018-11-06 NOTE — TELEPHONE ENCOUNTER
Requested Prescriptions     Signed Prescriptions Disp Refills   •  MG Tab 60 Tab 0     Sig: TAKE 1 TAB BY MOUTH EVERY 8 HOURS AS NEEDED (WITH FOOD).     Authorizing Provider: JEREMY JOYNER A.P.R.N.

## 2018-11-14 ENCOUNTER — TELEPHONE (OUTPATIENT)
Dept: URGENT CARE | Facility: PHYSICIAN GROUP | Age: 53
End: 2018-11-14

## 2019-01-29 RX ORDER — CYCLOBENZAPRINE HCL 5 MG
TABLET ORAL
Qty: 60 TAB | Refills: 0 | OUTPATIENT
Start: 2019-01-29

## 2019-01-29 NOTE — TELEPHONE ENCOUNTER
Was the patient seen in the last year in this department? Yes    Does patient have an active prescription for medications requested? No     Received Request Via: Pharmacy     Pt has been a No Show to his last 2 scheduled appt one to was to establish with a new PCP

## 2019-01-29 NOTE — TELEPHONE ENCOUNTER
Phone Number Called: [phone] (home)       Message: Patient informed he must establish with new provider    Left Message for patient to call back: N\A

## 2019-01-29 NOTE — TELEPHONE ENCOUNTER
Requested Prescriptions     Refused Prescriptions Disp Refills   • cyclobenzaprine (FLEXERIL) 5 MG tablet [Pharmacy Med Name: CYCLOBENZAPRINE 5 MG TABLET] 60 Tab 0     Sig: TAKE 1 TABLET BY MOUTH 2 TIMES A DAY AS NEEDED FOR SEVERE PAIN.     Refused By: JEREMY JOYNER     Reason for Refusal: Patient needs appointment.     Needs appointment  JUDITH Forman

## 2019-11-05 ENCOUNTER — HOSPITAL ENCOUNTER (OUTPATIENT)
Dept: RADIOLOGY | Facility: MEDICAL CENTER | Age: 54
End: 2019-11-05
Attending: NURSE PRACTITIONER
Payer: COMMERCIAL

## 2019-11-05 DIAGNOSIS — M54.9 DORSALGIA: ICD-10-CM

## 2019-11-05 PROCEDURE — 72040 X-RAY EXAM NECK SPINE 2-3 VW: CPT

## 2019-11-05 PROCEDURE — 72072 X-RAY EXAM THORAC SPINE 3VWS: CPT

## 2019-11-05 PROCEDURE — 72100 X-RAY EXAM L-S SPINE 2/3 VWS: CPT

## 2019-11-26 ENCOUNTER — HOSPITAL ENCOUNTER (OUTPATIENT)
Dept: RADIOLOGY | Facility: MEDICAL CENTER | Age: 54
End: 2019-11-26
Attending: NURSE PRACTITIONER
Payer: COMMERCIAL

## 2019-11-26 DIAGNOSIS — N50.819 TESTICULAR PAIN, UNSPECIFIED: ICD-10-CM

## 2019-11-26 PROCEDURE — 76870 US EXAM SCROTUM: CPT

## 2020-09-01 ENCOUNTER — OFFICE VISIT (OUTPATIENT)
Dept: URGENT CARE | Facility: PHYSICIAN GROUP | Age: 55
End: 2020-09-01

## 2020-09-01 VITALS
TEMPERATURE: 97 F | OXYGEN SATURATION: 98 % | WEIGHT: 144 LBS | BODY MASS INDEX: 22.6 KG/M2 | HEIGHT: 67 IN | HEART RATE: 77 BPM | SYSTOLIC BLOOD PRESSURE: 122 MMHG | DIASTOLIC BLOOD PRESSURE: 78 MMHG | RESPIRATION RATE: 16 BRPM

## 2020-09-01 DIAGNOSIS — H72.91 EAR DRUM PERFORATION, RIGHT: ICD-10-CM

## 2020-09-01 DIAGNOSIS — J30.2 SEASONAL ALLERGIES: ICD-10-CM

## 2020-09-01 PROCEDURE — 99213 OFFICE O/P EST LOW 20 MIN: CPT | Performed by: FAMILY MEDICINE

## 2020-09-01 RX ORDER — OFLOXACIN 3 MG/ML
5 SOLUTION AURICULAR (OTIC) 2 TIMES DAILY
Qty: 14 ML | Refills: 0 | Status: SHIPPED | OUTPATIENT
Start: 2020-09-01 | End: 2020-09-08

## 2020-09-01 RX ORDER — ATORVASTATIN CALCIUM 20 MG/1
20 TABLET, FILM COATED ORAL NIGHTLY
COMMUNITY
End: 2022-08-05

## 2020-09-01 RX ORDER — TAMSULOSIN HYDROCHLORIDE 0.4 MG/1
0.4 CAPSULE ORAL
COMMUNITY
End: 2022-08-05

## 2020-09-01 RX ORDER — CETIRIZINE HYDROCHLORIDE 10 MG/1
10 TABLET ORAL DAILY
Qty: 60 TAB | Refills: 1 | Status: SHIPPED | OUTPATIENT
Start: 2020-09-01 | End: 2020-10-31

## 2020-09-01 RX ORDER — OFLOXACIN 3 MG/ML
5 SOLUTION AURICULAR (OTIC) 2 TIMES DAILY
Qty: 14 ML | Refills: 0 | Status: SHIPPED | OUTPATIENT
Start: 2020-09-01 | End: 2020-09-01

## 2020-09-01 ASSESSMENT — ENCOUNTER SYMPTOMS
FEVER: 0
SHORTNESS OF BREATH: 0
VOMITING: 0

## 2020-09-01 NOTE — PROGRESS NOTES
Subjective:     Mario Esquivel is a 55 y.o. male who presents for Otalgia (shoved a q-tip in ear too far ear bled x 1 day)    HPI  Pt presents for evaluation of a new problem   Pt cleaning ear with Q-tip and bumped his elbow   Caused Q-tip to go too far in the ear   Now having pain in ear constantly   Feels mild decreased hearing   Had mild bleeding at the time     Review of Systems   Constitutional: Negative for fever.   HENT: Positive for ear discharge, ear pain, hearing loss and tinnitus.    Respiratory: Negative for shortness of breath.    Cardiovascular: Negative for chest pain.   Gastrointestinal: Negative for vomiting.   Skin: Negative for rash.     PMH:  has a past medical history of Arthritis, Headache, and Pure hypercholesterolemia (8/26/2016). He also has no past medical history of Hypertension.  MEDS:   Current Outpatient Medications:   •  atorvastatin (LIPITOR) 20 MG Tab, Take 20 mg by mouth every evening., Disp: , Rfl:   •  cyclobenzaprine (FLEXERIL) 5 MG tablet, TAKE 1 TABLET BY MOUTH 2 TIMES A DAY AS NEEDED FOR SEVERE PAIN., Disp: 60 Tab, Rfl: 1  •   MG Tab, TAKE 1 TAB BY MOUTH EVERY 8 HOURS AS NEEDED (WITH FOOD). (Patient not taking: Reported on 9/1/2020), Disp: 60 Tab, Rfl: 0  •  pravastatin (PRAVACHOL) 20 MG Tab, Take 1 Tab by mouth every bedtime. (Patient not taking: Reported on 9/1/2020), Disp: 90 Tab, Rfl: 3  •  rizatriptan (MAXALT) 10 MG tablet, Take 1 Tab by mouth Once PRN for Migraine for up to 1 dose. (Patient not taking: Reported on 9/1/2020), Disp: 10 Tab, Rfl: 3  •  amitriptyline (ELAVIL) 10 MG Tab, 10 mg at bedtime.  Increase 10 mg every 7 days to target 50 mg bedtime in 5 weeks. (Patient not taking: Reported on 9/1/2020), Disp: 100 Tab, Rfl: 3  •  promethazine (PHENERGAN) 25 MG Tab, 25 mg phenergan once on day with severe migraine.  Will cause sleepiness.  Do not drive. (Patient not taking: Reported on 9/1/2020), Disp: 20 Tab, Rfl: 12  •  cyclobenzaprine (FLEXERIL) 10 MG Tab,  "Take 1 Tab by mouth 3 times a day as needed. Begin taking just one half tab at a time and monitor sleepiness and response of neck and shoulder tightness. (Patient not taking: Reported on 9/1/2020), Disp: 30 Tab, Rfl: 0  •  acetaminophen/caffeine/butalbital 300-40-50 mg (FIORICET) -40 MG Cap capsule, TAKE 1 TABLET BY MOUTH EVERY FOUR HOURS AS NEEDED FOR HEADACHE. (Patient not taking: Reported on 9/1/2020), Disp: 30 Cap, Rfl: 0  •  acetaminophen/caffeine/butalbital 325-40-50 mg (FIORICET) -40 MG Tab, Take 1 Tab by mouth every four hours as needed for Headache. (Patient not taking: Reported on 9/1/2020), Disp: 30 Tab, Rfl: 0  ALLERGIES:   Allergies   Allergen Reactions   • Omnipaque [Iohexol] Unspecified     Mild reaction occurred after injection.  Pt stated throat was \"sore\"   Assessed the pt but no medication was given.     SURGHX: No past surgical history on file.  SOCHX:  reports that he has never smoked. He has never used smokeless tobacco. He reports that he does not drink alcohol or use drugs.  FH: Family history was reviewed, not contributing to acute ear pain      Objective:   /78 (BP Location: Left arm, Patient Position: Sitting, BP Cuff Size: Adult)   Pulse 77   Temp 36.1 °C (97 °F) (Temporal)   Resp 16   Ht 1.702 m (5' 7\")   Wt 65.3 kg (144 lb)   SpO2 98%   BMI 22.55 kg/m²     Physical Exam  Constitutional:       General: He is not in acute distress.     Appearance: He is well-developed. He is not diaphoretic.   HENT:      Head: Normocephalic and atraumatic.      Right Ear: External ear normal.      Left Ear: Tympanic membrane, ear canal and external ear normal.      Ears:      Comments:   Right tympanic membrane with large perforation, mild amount of bloody discharge throughout your canal, no purulence  Skin:     General: Skin is warm and dry.      Findings: No rash.   Neurological:      Mental Status: He is alert and oriented to person, place, and time.   Psychiatric:         " Behavior: Behavior normal.         Thought Content: Thought content normal.         Judgment: Judgment normal.       Assessment/Plan:   Assessment    1. Ear drum perforation, right  - REFERRAL TO ENT  - ofloxacin otic sol (FLOXIN OTIC) 0.3 % Solution; Place 5 Drops in right ear 2 times a day for 7 days.  Dispense: 14 mL; Refill: 0    2. Seasonal allergies  - cetirizine (ZYRTEC) 10 MG Tab; Take 1 Tab by mouth every day for 60 days.  Dispense: 60 Tab; Refill: 1    Patient with traumatic right eardrum perforation sustained yesterday.  Given ofloxacin drops to prevent infection and referred to ENT.  Counseled on importance of keeping ear dry and other supportive care measures.  Patient will follow-up in urgent care on an as-needed basis.

## 2021-06-07 NOTE — ASSESSMENT & PLAN NOTE
Ongoing issues; patient reports today that he has not been taking his medication consistently.  Upon further questioning, he does not recall if he is taking the 10 mg or 20 mg of the pravastatin when he does take his medication.  Review of chart shows that he has not had blood work done in almost a year and a half.  When confronted with this information patient seems unfazed and is unaware as to why I will refill 1 of the prescriptions since he is out of both of them.   Patient: Emiliano Reece    Procedure Summary     Date: 06/07/21 Room / Location: [address] [place] [address] MAIN OR 01 / [facility] MAIN OR    Anesthesia Start: 5125 Anesthesia Stop: 1757    Procedure: laparoscopic chris-en-y gastric bypass (N/A Abdomen) Diagnosis: (morbid obesity, ob

## 2022-08-05 ENCOUNTER — OFFICE VISIT (OUTPATIENT)
Dept: URGENT CARE | Facility: PHYSICIAN GROUP | Age: 57
End: 2022-08-05

## 2022-08-05 VITALS
BODY MASS INDEX: 24.16 KG/M2 | SYSTOLIC BLOOD PRESSURE: 118 MMHG | HEIGHT: 65 IN | DIASTOLIC BLOOD PRESSURE: 78 MMHG | RESPIRATION RATE: 16 BRPM | TEMPERATURE: 97.2 F | HEART RATE: 66 BPM | OXYGEN SATURATION: 99 % | WEIGHT: 145 LBS

## 2022-08-05 DIAGNOSIS — I88.9 LYMPHADENITIS: ICD-10-CM

## 2022-08-05 PROBLEM — R97.20 RAISED PROSTATE SPECIFIC ANTIGEN: Status: ACTIVE | Noted: 2019-06-05

## 2022-08-05 PROBLEM — N41.0 ACUTE PROSTATITIS: Status: ACTIVE | Noted: 2019-06-05

## 2022-08-05 PROBLEM — N40.0 BENIGN PROSTATIC HYPERPLASIA: Status: ACTIVE | Noted: 2019-12-11

## 2022-08-05 PROCEDURE — 99213 OFFICE O/P EST LOW 20 MIN: CPT | Performed by: EMERGENCY MEDICINE

## 2022-08-05 RX ORDER — CEPHALEXIN 500 MG/1
500 CAPSULE ORAL 4 TIMES DAILY
Qty: 40 CAPSULE | Refills: 0 | Status: SHIPPED | OUTPATIENT
Start: 2022-08-05 | End: 2022-08-15

## 2022-08-05 ASSESSMENT — ENCOUNTER SYMPTOMS
CHILLS: 0
VOMITING: 0
FEVER: 1
NAUSEA: 0
SORE THROAT: 1
COUGH: 0
SHORTNESS OF BREATH: 0

## 2022-08-06 NOTE — PROGRESS NOTES
"  Subjective:     Mario Esquivel  is a 56 y.o. male who presents for Fever (2 days ago had chills and started with a pain under the chin 1 day ago , throat hurts to swallow )       All history obtained through [facility] .  Patient presents for evaluation of a lump under his chin for the last 2 days.  States it hurts to swallow.  He denies any prior history of same.  He took 2 doses of ampicillin, 1 yesterday and 1 today, without improvement.  He denies any exposure to illness.  He states he had a fever 2 days ago.  He reports he is not diabetic, although he is \"prediabetic.  He is a non-smoker, he does not use chewing tobacco.  He denies any other systemic symptoms, any respiratory symptoms, any dental issues.  He has no known allergies.   Review of Systems   Constitutional: Positive for fever. Negative for chills.   HENT: Positive for sore throat.         No dental pain   Respiratory: Negative for cough and shortness of breath.    Gastrointestinal: Negative for nausea and vomiting.   All other systems reviewed and are negative.     Allergies   Allergen Reactions   • Omnipaque [Iohexol] Unspecified     Mild reaction occurred after injection.  Pt stated throat was \"sore\"   Assessed the pt but no medication was given.     Past Medical History:   Diagnosis Date   • Arthritis    • Headache    • Pure hypercholesterolemia 8/26/2016        Objective:   /78 (BP Location: Right arm, Patient Position: Sitting, BP Cuff Size: Adult)   Pulse 66   Temp 36.2 °C (97.2 °F) (Temporal)   Resp 16   Ht 1.651 m (5' 5\")   Wt 65.8 kg (145 lb)   SpO2 99%   BMI 24.13 kg/m²   Physical Exam  Vitals and nursing note reviewed.   Constitutional:       Appearance: Normal appearance. He is not ill-appearing, toxic-appearing or diaphoretic.      Comments: Slender male,   HENT:      Head: Normocephalic and atraumatic.      Nose: No rhinorrhea.      Mouth/Throat:      Mouth: Mucous membranes are moist.      Pharynx: No " oropharyngeal exudate or posterior oropharyngeal erythema.      Comments: Normal voice, no posterior pharyngeal lesions.  No tenderness on palpation of the floor of the mouth, no Barrington's angina.  Discrete 3 x 2 cm tender midline submental soft tissue mass in the midline, which is the lump patient complains of.  Eyes:      General: No scleral icterus.  Neck:      Comments: No lateral cervical adenopathy simply at the submental soft tissue mass as noted above.  Cardiovascular:      Rate and Rhythm: Normal rate and regular rhythm.      Heart sounds: Normal heart sounds. No murmur heard.  Pulmonary:      Effort: Pulmonary effort is normal. No respiratory distress.      Breath sounds: Normal breath sounds.   Musculoskeletal:      Cervical back: Normal range of motion and neck supple.   Lymphadenopathy:      Cervical: Cervical adenopathy present.   Skin:     General: Skin is warm.      Findings: No rash.   Neurological:      Mental Status: He is alert and oriented to person, place, and time.   Psychiatric:         Mood and Affect: Mood normal.         Behavior: Behavior normal.         Thought Content: Thought content normal.           Assessment/Plan:     Encounter Diagnoses   Name Primary?   • Lymphadenitis    Patient with submandibular lesion, tender to touch, acute in onset over the last 48 hours.  No  exTensive discussion through .  Patient states he does have a primary care physician but they could not see him today.  He understands that it is extremely important that he follow-up next week if he is not improving, or seek care sooner if he is worsened.  He is aware that this is an unusual area for a swollen lymph node, and that there is concern for more serious process including cancer although by history today he does not have any risk factors for that as he is a non-smoker and does not use chewing tobacco.  We will try a course of Keflex for lymphadenitis, and patient is to have a low threshold for  return if not improving.  Patient is agreeable with plan, all patient's questions were answered through the .    See AVS Instructions below for written guidance provided to patient on after-visit management and care in addition to our verbal discussion during the visit.    Please note that this dictation was created using voice recognition software. I have attempted to correct all errors, but there may be sound-alike, spelling, grammar and possibly content errors that I did not discover before finalizing the note.

## 2022-08-06 NOTE — PATIENT INSTRUCTIONS
Please follow up with your primary care doctor if not improved in 3-4 days, return sooner if worse, difficulty swallowing, persistent fever.

## 2023-08-09 ENCOUNTER — HOSPITAL ENCOUNTER (OUTPATIENT)
Dept: RADIOLOGY | Facility: MEDICAL CENTER | Age: 58
End: 2023-08-09
Attending: UROLOGY
Payer: COMMERCIAL

## 2023-08-09 DIAGNOSIS — R97.20 ELEVATED PROSTATE SPECIFIC ANTIGEN (PSA): ICD-10-CM

## 2023-08-09 PROCEDURE — 700111 HCHG RX REV CODE 636 W/ 250 OVERRIDE (IP): Mod: JG | Performed by: RADIOLOGY

## 2023-08-09 PROCEDURE — A9579 GAD-BASE MR CONTRAST NOS,1ML: HCPCS | Performed by: UROLOGY

## 2023-08-09 PROCEDURE — 700117 HCHG RX CONTRAST REV CODE 255: Performed by: UROLOGY

## 2023-08-09 PROCEDURE — 72197 MRI PELVIS W/O & W/DYE: CPT

## 2023-08-09 RX ADMIN — GADOTERIDOL 15 ML: 279.3 INJECTION, SOLUTION INTRAVENOUS at 14:30

## 2023-08-09 RX ADMIN — GLUCAGON 1 MG: 1 INJECTION, POWDER, LYOPHILIZED, FOR SOLUTION INTRAMUSCULAR; INTRAVENOUS at 13:54

## 2024-10-24 ENCOUNTER — OFFICE VISIT (OUTPATIENT)
Dept: URGENT CARE | Facility: PHYSICIAN GROUP | Age: 59
End: 2024-10-24
Payer: COMMERCIAL

## 2024-10-24 VITALS
WEIGHT: 147.4 LBS | DIASTOLIC BLOOD PRESSURE: 78 MMHG | HEART RATE: 86 BPM | HEIGHT: 65 IN | RESPIRATION RATE: 16 BRPM | TEMPERATURE: 98.3 F | OXYGEN SATURATION: 98 % | BODY MASS INDEX: 24.56 KG/M2 | SYSTOLIC BLOOD PRESSURE: 124 MMHG

## 2024-10-24 DIAGNOSIS — R22.0 SUBMANDIBULAR SWELLING: ICD-10-CM

## 2024-10-24 DIAGNOSIS — R22.1 LOCALIZED SWELLING, MASS OR LUMP OF NECK: ICD-10-CM

## 2024-10-24 DIAGNOSIS — R22.1 SUBMANDIBULAR SWELLING: ICD-10-CM

## 2024-10-24 DIAGNOSIS — K04.7 DENTAL ABSCESS: ICD-10-CM

## 2024-10-24 PROCEDURE — 3074F SYST BP LT 130 MM HG: CPT

## 2024-10-24 PROCEDURE — 99204 OFFICE O/P NEW MOD 45 MIN: CPT

## 2024-10-24 PROCEDURE — 3078F DIAST BP <80 MM HG: CPT

## 2024-10-24 RX ORDER — TAMSULOSIN HYDROCHLORIDE 0.4 MG/1
0.4 CAPSULE ORAL DAILY
COMMUNITY

## 2024-10-24 RX ORDER — ATORVASTATIN CALCIUM 20 MG/1
1 TABLET, FILM COATED ORAL DAILY
COMMUNITY
End: 2024-10-27

## 2024-10-25 ASSESSMENT — ENCOUNTER SYMPTOMS
PHOTOPHOBIA: 0
SINUS PAIN: 0
FEVER: 0
MYALGIAS: 0
SPEECH CHANGE: 0
EYE DISCHARGE: 0
DOUBLE VISION: 0
EYE PAIN: 0
BLURRED VISION: 0
VOMITING: 0
DIARRHEA: 0
CHILLS: 0
PALPITATIONS: 0
SORE THROAT: 1
ABDOMINAL PAIN: 0
COUGH: 0
WEAKNESS: 0
NECK PAIN: 1
HEADACHES: 0
SEIZURES: 0
LOSS OF CONSCIOUSNESS: 0
SPUTUM PRODUCTION: 0
NAUSEA: 0
TINGLING: 0
EYE REDNESS: 0
SHORTNESS OF BREATH: 0
SENSORY CHANGE: 0
FOCAL WEAKNESS: 0
DIZZINESS: 0
WHEEZING: 0
STRIDOR: 0

## 2024-10-25 ASSESSMENT — VISUAL ACUITY: OU: 1

## 2024-10-27 ENCOUNTER — HOSPITAL ENCOUNTER (OUTPATIENT)
Dept: RADIOLOGY | Facility: MEDICAL CENTER | Age: 59
End: 2024-10-27

## 2024-10-27 ENCOUNTER — APPOINTMENT (OUTPATIENT)
Dept: RADIOLOGY | Facility: MEDICAL CENTER | Age: 59
End: 2024-10-27
Attending: EMERGENCY MEDICINE
Payer: COMMERCIAL

## 2024-10-27 ENCOUNTER — ANESTHESIA (OUTPATIENT)
Dept: SURGERY | Facility: MEDICAL CENTER | Age: 59
End: 2024-10-27
Payer: COMMERCIAL

## 2024-10-27 ENCOUNTER — HOSPITAL ENCOUNTER (OUTPATIENT)
Facility: MEDICAL CENTER | Age: 59
End: 2024-10-28
Attending: EMERGENCY MEDICINE | Admitting: STUDENT IN AN ORGANIZED HEALTH CARE EDUCATION/TRAINING PROGRAM
Payer: COMMERCIAL

## 2024-10-27 ENCOUNTER — ANESTHESIA EVENT (OUTPATIENT)
Dept: SURGERY | Facility: MEDICAL CENTER | Age: 59
End: 2024-10-27
Payer: COMMERCIAL

## 2024-10-27 DIAGNOSIS — K04.7 DENTAL ABSCESS: ICD-10-CM

## 2024-10-27 PROBLEM — I10 HYPERTENSION: Status: ACTIVE | Noted: 2024-10-27

## 2024-10-27 LAB — EKG IMPRESSION: NORMAL

## 2024-10-27 PROCEDURE — 700111 HCHG RX REV CODE 636 W/ 250 OVERRIDE (IP): Performed by: STUDENT IN AN ORGANIZED HEALTH CARE EDUCATION/TRAINING PROGRAM

## 2024-10-27 PROCEDURE — A9270 NON-COVERED ITEM OR SERVICE: HCPCS | Performed by: ORAL & MAXILLOFACIAL SURGERY

## 2024-10-27 PROCEDURE — 700102 HCHG RX REV CODE 250 W/ 637 OVERRIDE(OP): Performed by: STUDENT IN AN ORGANIZED HEALTH CARE EDUCATION/TRAINING PROGRAM

## 2024-10-27 PROCEDURE — 93010 ELECTROCARDIOGRAM REPORT: CPT | Performed by: INTERNAL MEDICINE

## 2024-10-27 PROCEDURE — 87205 SMEAR GRAM STAIN: CPT

## 2024-10-27 PROCEDURE — 99223 1ST HOSP IP/OBS HIGH 75: CPT | Performed by: STUDENT IN AN ORGANIZED HEALTH CARE EDUCATION/TRAINING PROGRAM

## 2024-10-27 PROCEDURE — 700102 HCHG RX REV CODE 250 W/ 637 OVERRIDE(OP): Performed by: EMERGENCY MEDICINE

## 2024-10-27 PROCEDURE — 87077 CULTURE AEROBIC IDENTIFY: CPT

## 2024-10-27 PROCEDURE — 700101 HCHG RX REV CODE 250: Performed by: ORAL & MAXILLOFACIAL SURGERY

## 2024-10-27 PROCEDURE — G0378 HOSPITAL OBSERVATION PER HR: HCPCS

## 2024-10-27 PROCEDURE — 160002 HCHG RECOVERY MINUTES (STAT): Performed by: ORAL & MAXILLOFACIAL SURGERY

## 2024-10-27 PROCEDURE — 160027 HCHG SURGERY MINUTES - 1ST 30 MINS LEVEL 2: Performed by: ORAL & MAXILLOFACIAL SURGERY

## 2024-10-27 PROCEDURE — 96365 THER/PROPH/DIAG IV INF INIT: CPT | Mod: XU

## 2024-10-27 PROCEDURE — 87070 CULTURE OTHR SPECIMN AEROBIC: CPT

## 2024-10-27 PROCEDURE — A9270 NON-COVERED ITEM OR SERVICE: HCPCS | Performed by: EMERGENCY MEDICINE

## 2024-10-27 PROCEDURE — C1729 CATH, DRAINAGE: HCPCS | Performed by: ORAL & MAXILLOFACIAL SURGERY

## 2024-10-27 PROCEDURE — 700111 HCHG RX REV CODE 636 W/ 250 OVERRIDE (IP): Mod: JZ | Performed by: ORAL & MAXILLOFACIAL SURGERY

## 2024-10-27 PROCEDURE — 700102 HCHG RX REV CODE 250 W/ 637 OVERRIDE(OP): Performed by: ORAL & MAXILLOFACIAL SURGERY

## 2024-10-27 PROCEDURE — 160048 HCHG OR STATISTICAL LEVEL 1-5: Performed by: ORAL & MAXILLOFACIAL SURGERY

## 2024-10-27 PROCEDURE — 160038 HCHG SURGERY MINUTES - EA ADDL 1 MIN LEVEL 2: Performed by: ORAL & MAXILLOFACIAL SURGERY

## 2024-10-27 PROCEDURE — 700105 HCHG RX REV CODE 258: Performed by: STUDENT IN AN ORGANIZED HEALTH CARE EDUCATION/TRAINING PROGRAM

## 2024-10-27 PROCEDURE — 160009 HCHG ANES TIME/MIN: Performed by: ORAL & MAXILLOFACIAL SURGERY

## 2024-10-27 PROCEDURE — 70355 PANORAMIC X-RAY OF JAWS: CPT

## 2024-10-27 PROCEDURE — 160035 HCHG PACU - 1ST 60 MINS PHASE I: Performed by: ORAL & MAXILLOFACIAL SURGERY

## 2024-10-27 PROCEDURE — 700101 HCHG RX REV CODE 250: Performed by: STUDENT IN AN ORGANIZED HEALTH CARE EDUCATION/TRAINING PROGRAM

## 2024-10-27 PROCEDURE — 96375 TX/PRO/DX INJ NEW DRUG ADDON: CPT | Mod: XU

## 2024-10-27 PROCEDURE — A9270 NON-COVERED ITEM OR SERVICE: HCPCS | Performed by: STUDENT IN AN ORGANIZED HEALTH CARE EDUCATION/TRAINING PROGRAM

## 2024-10-27 PROCEDURE — 93005 ELECTROCARDIOGRAM TRACING: CPT | Mod: XE | Performed by: STUDENT IN AN ORGANIZED HEALTH CARE EDUCATION/TRAINING PROGRAM

## 2024-10-27 PROCEDURE — 700111 HCHG RX REV CODE 636 W/ 250 OVERRIDE (IP): Mod: JZ | Performed by: STUDENT IN AN ORGANIZED HEALTH CARE EDUCATION/TRAINING PROGRAM

## 2024-10-27 PROCEDURE — 87076 CULTURE ANAEROBE IDENT EACH: CPT

## 2024-10-27 PROCEDURE — 99285 EMERGENCY DEPT VISIT HI MDM: CPT

## 2024-10-27 RX ORDER — MEPERIDINE HYDROCHLORIDE 25 MG/ML
12.5 INJECTION INTRAMUSCULAR; INTRAVENOUS; SUBCUTANEOUS
Status: DISCONTINUED | OUTPATIENT
Start: 2024-10-27 | End: 2024-10-27 | Stop reason: HOSPADM

## 2024-10-27 RX ORDER — ONDANSETRON 4 MG/1
4 TABLET, ORALLY DISINTEGRATING ORAL EVERY 4 HOURS PRN
Status: DISCONTINUED | OUTPATIENT
Start: 2024-10-27 | End: 2024-10-28 | Stop reason: HOSPADM

## 2024-10-27 RX ORDER — ROSUVASTATIN CALCIUM 5 MG/1
5 TABLET, COATED ORAL
COMMUNITY
Start: 2024-07-31

## 2024-10-27 RX ORDER — ONDANSETRON 2 MG/ML
INJECTION INTRAMUSCULAR; INTRAVENOUS PRN
Status: DISCONTINUED | OUTPATIENT
Start: 2024-10-27 | End: 2024-10-27 | Stop reason: SURG

## 2024-10-27 RX ORDER — HYDROMORPHONE HYDROCHLORIDE 1 MG/ML
0.4 INJECTION, SOLUTION INTRAMUSCULAR; INTRAVENOUS; SUBCUTANEOUS
Status: DISCONTINUED | OUTPATIENT
Start: 2024-10-27 | End: 2024-10-27 | Stop reason: HOSPADM

## 2024-10-27 RX ORDER — NAPROXEN SODIUM 220 MG/1
220 TABLET, FILM COATED ORAL ONCE
Status: ON HOLD | COMMUNITY
End: 2024-10-28

## 2024-10-27 RX ORDER — LIDOCAINE HYDROCHLORIDE 20 MG/ML
INJECTION, SOLUTION EPIDURAL; INFILTRATION; INTRACAUDAL; PERINEURAL PRN
Status: DISCONTINUED | OUTPATIENT
Start: 2024-10-27 | End: 2024-10-27 | Stop reason: SURG

## 2024-10-27 RX ORDER — PROMETHAZINE HYDROCHLORIDE 25 MG/1
12.5-25 TABLET ORAL EVERY 4 HOURS PRN
Status: DISCONTINUED | OUTPATIENT
Start: 2024-10-27 | End: 2024-10-28 | Stop reason: HOSPADM

## 2024-10-27 RX ORDER — DEXAMETHASONE SODIUM PHOSPHATE 4 MG/ML
INJECTION, SOLUTION INTRA-ARTICULAR; INTRALESIONAL; INTRAMUSCULAR; INTRAVENOUS; SOFT TISSUE PRN
Status: DISCONTINUED | OUTPATIENT
Start: 2024-10-27 | End: 2024-10-27 | Stop reason: SURG

## 2024-10-27 RX ORDER — HYDRALAZINE HYDROCHLORIDE 20 MG/ML
10 INJECTION INTRAMUSCULAR; INTRAVENOUS EVERY 6 HOURS PRN
Status: DISCONTINUED | OUTPATIENT
Start: 2024-10-27 | End: 2024-10-28 | Stop reason: HOSPADM

## 2024-10-27 RX ORDER — IBUPROFEN 200 MG
400 TABLET ORAL ONCE
Status: ON HOLD | COMMUNITY
End: 2024-10-28

## 2024-10-27 RX ORDER — ACETAMINOPHEN 500 MG
1000 TABLET ORAL EVERY 6 HOURS
Status: DISCONTINUED | OUTPATIENT
Start: 2024-10-28 | End: 2024-10-28 | Stop reason: HOSPADM

## 2024-10-27 RX ORDER — OXYCODONE HYDROCHLORIDE 10 MG/1
10 TABLET ORAL
Status: DISCONTINUED | OUTPATIENT
Start: 2024-10-27 | End: 2024-10-28 | Stop reason: HOSPADM

## 2024-10-27 RX ORDER — HALOPERIDOL 5 MG/ML
1 INJECTION INTRAMUSCULAR
Status: DISCONTINUED | OUTPATIENT
Start: 2024-10-27 | End: 2024-10-27 | Stop reason: HOSPADM

## 2024-10-27 RX ORDER — DIPHENHYDRAMINE HYDROCHLORIDE 50 MG/ML
12.5 INJECTION INTRAMUSCULAR; INTRAVENOUS
Status: DISCONTINUED | OUTPATIENT
Start: 2024-10-27 | End: 2024-10-27 | Stop reason: HOSPADM

## 2024-10-27 RX ORDER — PROMETHAZINE HYDROCHLORIDE 25 MG/1
12.5-25 SUPPOSITORY RECTAL EVERY 4 HOURS PRN
Status: DISCONTINUED | OUTPATIENT
Start: 2024-10-27 | End: 2024-10-28 | Stop reason: HOSPADM

## 2024-10-27 RX ORDER — HYDROMORPHONE HYDROCHLORIDE 1 MG/ML
0.2 INJECTION, SOLUTION INTRAMUSCULAR; INTRAVENOUS; SUBCUTANEOUS
Status: DISCONTINUED | OUTPATIENT
Start: 2024-10-27 | End: 2024-10-27 | Stop reason: HOSPADM

## 2024-10-27 RX ORDER — HYDROMORPHONE HYDROCHLORIDE 1 MG/ML
0.1 INJECTION, SOLUTION INTRAMUSCULAR; INTRAVENOUS; SUBCUTANEOUS
Status: DISCONTINUED | OUTPATIENT
Start: 2024-10-27 | End: 2024-10-27 | Stop reason: HOSPADM

## 2024-10-27 RX ORDER — ONDANSETRON 2 MG/ML
4 INJECTION INTRAMUSCULAR; INTRAVENOUS
Status: DISCONTINUED | OUTPATIENT
Start: 2024-10-27 | End: 2024-10-27 | Stop reason: HOSPADM

## 2024-10-27 RX ORDER — OXYCODONE AND ACETAMINOPHEN 5; 325 MG/1; MG/1
1 TABLET ORAL ONCE
Status: COMPLETED | OUTPATIENT
Start: 2024-10-27 | End: 2024-10-27

## 2024-10-27 RX ORDER — KETOROLAC TROMETHAMINE 15 MG/ML
15 INJECTION, SOLUTION INTRAMUSCULAR; INTRAVENOUS EVERY 6 HOURS
Status: DISCONTINUED | OUTPATIENT
Start: 2024-10-28 | End: 2024-10-28 | Stop reason: HOSPADM

## 2024-10-27 RX ORDER — PROCHLORPERAZINE EDISYLATE 5 MG/ML
5-10 INJECTION INTRAMUSCULAR; INTRAVENOUS EVERY 4 HOURS PRN
Status: DISCONTINUED | OUTPATIENT
Start: 2024-10-27 | End: 2024-10-28 | Stop reason: HOSPADM

## 2024-10-27 RX ORDER — OXYCODONE HCL 5 MG/5 ML
5 SOLUTION, ORAL ORAL
Status: COMPLETED | OUTPATIENT
Start: 2024-10-27 | End: 2024-10-27

## 2024-10-27 RX ORDER — OXYCODONE HYDROCHLORIDE 5 MG/1
5 TABLET ORAL
Status: DISCONTINUED | OUTPATIENT
Start: 2024-10-27 | End: 2024-10-28 | Stop reason: HOSPADM

## 2024-10-27 RX ORDER — CEFAZOLIN SODIUM 1 G/3ML
INJECTION, POWDER, FOR SOLUTION INTRAMUSCULAR; INTRAVENOUS PRN
Status: DISCONTINUED | OUTPATIENT
Start: 2024-10-27 | End: 2024-10-27 | Stop reason: SURG

## 2024-10-27 RX ORDER — SODIUM CHLORIDE, SODIUM LACTATE, POTASSIUM CHLORIDE, CALCIUM CHLORIDE 600; 310; 30; 20 MG/100ML; MG/100ML; MG/100ML; MG/100ML
INJECTION, SOLUTION INTRAVENOUS CONTINUOUS
Status: DISCONTINUED | OUTPATIENT
Start: 2024-10-27 | End: 2024-10-27 | Stop reason: HOSPADM

## 2024-10-27 RX ORDER — HYDROMORPHONE HYDROCHLORIDE 1 MG/ML
0.5 INJECTION, SOLUTION INTRAMUSCULAR; INTRAVENOUS; SUBCUTANEOUS
Status: DISCONTINUED | OUTPATIENT
Start: 2024-10-27 | End: 2024-10-28 | Stop reason: HOSPADM

## 2024-10-27 RX ORDER — ROCURONIUM BROMIDE 10 MG/ML
INJECTION, SOLUTION INTRAVENOUS PRN
Status: DISCONTINUED | OUTPATIENT
Start: 2024-10-27 | End: 2024-10-27 | Stop reason: SURG

## 2024-10-27 RX ORDER — ACETAMINOPHEN 500 MG
1000 TABLET ORAL EVERY 6 HOURS PRN
Status: DISCONTINUED | OUTPATIENT
Start: 2024-11-02 | End: 2024-10-28 | Stop reason: HOSPADM

## 2024-10-27 RX ORDER — SODIUM CHLORIDE, SODIUM LACTATE, POTASSIUM CHLORIDE, CALCIUM CHLORIDE 600; 310; 30; 20 MG/100ML; MG/100ML; MG/100ML; MG/100ML
INJECTION, SOLUTION INTRAVENOUS
Status: DISCONTINUED | OUTPATIENT
Start: 2024-10-27 | End: 2024-10-27 | Stop reason: SURG

## 2024-10-27 RX ORDER — IBUPROFEN 800 MG/1
800 TABLET, FILM COATED ORAL 3 TIMES DAILY PRN
Status: DISCONTINUED | OUTPATIENT
Start: 2024-10-31 | End: 2024-10-28 | Stop reason: HOSPADM

## 2024-10-27 RX ORDER — OXYCODONE HCL 5 MG/5 ML
10 SOLUTION, ORAL ORAL
Status: COMPLETED | OUTPATIENT
Start: 2024-10-27 | End: 2024-10-27

## 2024-10-27 RX ORDER — ONDANSETRON 2 MG/ML
4 INJECTION INTRAMUSCULAR; INTRAVENOUS EVERY 4 HOURS PRN
Status: DISCONTINUED | OUTPATIENT
Start: 2024-10-27 | End: 2024-10-28 | Stop reason: HOSPADM

## 2024-10-27 RX ADMIN — OXYCODONE AND ACETAMINOPHEN 1 TABLET: 5; 325 TABLET ORAL at 16:57

## 2024-10-27 RX ADMIN — DEXAMETHASONE SODIUM PHOSPHATE 4 MG: 4 INJECTION INTRA-ARTICULAR; INTRALESIONAL; INTRAMUSCULAR; INTRAVENOUS; SOFT TISSUE at 20:25

## 2024-10-27 RX ADMIN — AMPICILLIN AND SULBACTAM 3 G: 1; 2 INJECTION, POWDER, FOR SOLUTION INTRAMUSCULAR; INTRAVENOUS at 23:57

## 2024-10-27 RX ADMIN — LIDOCAINE HYDROCHLORIDE 60 MG: 20 INJECTION, SOLUTION EPIDURAL; INFILTRATION; INTRACAUDAL at 20:20

## 2024-10-27 RX ADMIN — HYDROMORPHONE HYDROCHLORIDE 0.4 MG: 1 INJECTION, SOLUTION INTRAMUSCULAR; INTRAVENOUS; SUBCUTANEOUS at 21:38

## 2024-10-27 RX ADMIN — ACETAMINOPHEN 1000 MG: 500 TABLET ORAL at 23:58

## 2024-10-27 RX ADMIN — OXYCODONE HYDROCHLORIDE 10 MG: 5 SOLUTION ORAL at 21:27

## 2024-10-27 RX ADMIN — HYDROMORPHONE HYDROCHLORIDE 0.2 MG: 1 INJECTION, SOLUTION INTRAMUSCULAR; INTRAVENOUS; SUBCUTANEOUS at 21:48

## 2024-10-27 RX ADMIN — FENTANYL CITRATE 50 MCG: 50 INJECTION, SOLUTION INTRAMUSCULAR; INTRAVENOUS at 21:34

## 2024-10-27 RX ADMIN — FENTANYL CITRATE 100 MCG: 50 INJECTION, SOLUTION INTRAMUSCULAR; INTRAVENOUS at 20:17

## 2024-10-27 RX ADMIN — FENTANYL CITRATE 50 MCG: 50 INJECTION, SOLUTION INTRAMUSCULAR; INTRAVENOUS at 21:29

## 2024-10-27 RX ADMIN — KETOROLAC TROMETHAMINE 15 MG: 15 INJECTION, SOLUTION INTRAMUSCULAR; INTRAVENOUS at 23:58

## 2024-10-27 RX ADMIN — HYDROMORPHONE HYDROCHLORIDE 0.4 MG: 1 INJECTION, SOLUTION INTRAMUSCULAR; INTRAVENOUS; SUBCUTANEOUS at 21:43

## 2024-10-27 RX ADMIN — FENTANYL CITRATE 50 MCG: 50 INJECTION, SOLUTION INTRAMUSCULAR; INTRAVENOUS at 20:57

## 2024-10-27 RX ADMIN — CEFAZOLIN 2 G: 1 INJECTION, POWDER, FOR SOLUTION INTRAMUSCULAR; INTRAVENOUS at 20:26

## 2024-10-27 RX ADMIN — ROCURONIUM BROMIDE 40 MG: 50 INJECTION, SOLUTION INTRAVENOUS at 20:20

## 2024-10-27 RX ADMIN — ONDANSETRON 4 MG: 2 INJECTION INTRAMUSCULAR; INTRAVENOUS at 20:52

## 2024-10-27 RX ADMIN — PROPOFOL 120 MG: 10 INJECTION, EMULSION INTRAVENOUS at 20:20

## 2024-10-27 RX ADMIN — SODIUM CHLORIDE, POTASSIUM CHLORIDE, SODIUM LACTATE AND CALCIUM CHLORIDE: 600; 310; 30; 20 INJECTION, SOLUTION INTRAVENOUS at 20:20

## 2024-10-27 RX ADMIN — SUGAMMADEX 200 MG: 100 INJECTION, SOLUTION INTRAVENOUS at 21:15

## 2024-10-27 SDOH — ECONOMIC STABILITY: TRANSPORTATION INSECURITY
IN THE PAST 12 MONTHS, HAS THE LACK OF TRANSPORTATION KEPT YOU FROM MEDICAL APPOINTMENTS OR FROM GETTING MEDICATIONS?: NO

## 2024-10-27 SDOH — ECONOMIC STABILITY: TRANSPORTATION INSECURITY
IN THE PAST 12 MONTHS, HAS LACK OF RELIABLE TRANSPORTATION KEPT YOU FROM MEDICAL APPOINTMENTS, MEETINGS, WORK OR FROM GETTING THINGS NEEDED FOR DAILY LIVING?: NO

## 2024-10-27 ASSESSMENT — PATIENT HEALTH QUESTIONNAIRE - PHQ9
1. LITTLE INTEREST OR PLEASURE IN DOING THINGS: NOT AT ALL
2. FEELING DOWN, DEPRESSED, IRRITABLE, OR HOPELESS: NOT AT ALL
SUM OF ALL RESPONSES TO PHQ9 QUESTIONS 1 AND 2: 0

## 2024-10-27 ASSESSMENT — ENCOUNTER SYMPTOMS
NECK PAIN: 0
DOUBLE VISION: 0
COUGH: 0
CHILLS: 0
BRUISES/BLEEDS EASILY: 0
NAUSEA: 0
DEPRESSION: 0
HEADACHES: 0
FEVER: 0
HEARTBURN: 0
PALPITATIONS: 0
DIZZINESS: 0
HEMOPTYSIS: 0
MYALGIAS: 0
BLURRED VISION: 0

## 2024-10-27 ASSESSMENT — LIFESTYLE VARIABLES
AVERAGE NUMBER OF DAYS PER WEEK YOU HAVE A DRINK CONTAINING ALCOHOL: 0
EVER HAD A DRINK FIRST THING IN THE MORNING TO STEADY YOUR NERVES TO GET RID OF A HANGOVER: NO
HOW MANY TIMES IN THE PAST YEAR HAVE YOU HAD 5 OR MORE DRINKS IN A DAY: 0
TOTAL SCORE: 0
CONSUMPTION TOTAL: NEGATIVE
ALCOHOL_USE: NO
EVER FELT BAD OR GUILTY ABOUT YOUR DRINKING: NO
TOTAL SCORE: 0
TOTAL SCORE: 0
DOES PATIENT WANT TO STOP DRINKING: NO
HAVE YOU EVER FELT YOU SHOULD CUT DOWN ON YOUR DRINKING: NO
ON A TYPICAL DAY WHEN YOU DRINK ALCOHOL HOW MANY DRINKS DO YOU HAVE: 0
HAVE PEOPLE ANNOYED YOU BY CRITICIZING YOUR DRINKING: NO

## 2024-10-27 ASSESSMENT — SOCIAL DETERMINANTS OF HEALTH (SDOH)
WITHIN THE LAST YEAR, HAVE YOU BEEN KICKED, HIT, SLAPPED, OR OTHERWISE PHYSICALLY HURT BY YOUR PARTNER OR EX-PARTNER?: NO
WITHIN THE PAST 12 MONTHS, THE FOOD YOU BOUGHT JUST DIDN'T LAST AND YOU DIDN'T HAVE MONEY TO GET MORE: NEVER TRUE
WITHIN THE LAST YEAR, HAVE YOU BEEN AFRAID OF YOUR PARTNER OR EX-PARTNER?: NO
WITHIN THE LAST YEAR, HAVE YOU BEEN HUMILIATED OR EMOTIONALLY ABUSED IN OTHER WAYS BY YOUR PARTNER OR EX-PARTNER?: NO
IN THE PAST 12 MONTHS, HAS THE ELECTRIC, GAS, OIL, OR WATER COMPANY THREATENED TO SHUT OFF SERVICE IN YOUR HOME?: NO
WITHIN THE LAST YEAR, HAVE TO BEEN RAPED OR FORCED TO HAVE ANY KIND OF SEXUAL ACTIVITY BY YOUR PARTNER OR EX-PARTNER?: NO
WITHIN THE PAST 12 MONTHS, YOU WORRIED THAT YOUR FOOD WOULD RUN OUT BEFORE YOU GOT THE MONEY TO BUY MORE: NEVER TRUE

## 2024-10-28 VITALS
RESPIRATION RATE: 16 BRPM | BODY MASS INDEX: 22.84 KG/M2 | SYSTOLIC BLOOD PRESSURE: 113 MMHG | HEART RATE: 87 BPM | WEIGHT: 145.5 LBS | TEMPERATURE: 98 F | DIASTOLIC BLOOD PRESSURE: 65 MMHG | HEIGHT: 67 IN | OXYGEN SATURATION: 95 %

## 2024-10-28 LAB
GRAM STN SPEC: NORMAL
SIGNIFICANT IND 70042: NORMAL
SITE SITE: NORMAL
SOURCE SOURCE: NORMAL

## 2024-10-28 PROCEDURE — 96375 TX/PRO/DX INJ NEW DRUG ADDON: CPT

## 2024-10-28 PROCEDURE — 99239 HOSP IP/OBS DSCHRG MGMT >30: CPT | Performed by: INTERNAL MEDICINE

## 2024-10-28 PROCEDURE — 700111 HCHG RX REV CODE 636 W/ 250 OVERRIDE (IP): Mod: JZ | Performed by: STUDENT IN AN ORGANIZED HEALTH CARE EDUCATION/TRAINING PROGRAM

## 2024-10-28 PROCEDURE — 700102 HCHG RX REV CODE 250 W/ 637 OVERRIDE(OP): Performed by: STUDENT IN AN ORGANIZED HEALTH CARE EDUCATION/TRAINING PROGRAM

## 2024-10-28 PROCEDURE — 96376 TX/PRO/DX INJ SAME DRUG ADON: CPT

## 2024-10-28 PROCEDURE — 700111 HCHG RX REV CODE 636 W/ 250 OVERRIDE (IP): Mod: JZ | Performed by: ORAL & MAXILLOFACIAL SURGERY

## 2024-10-28 PROCEDURE — 700102 HCHG RX REV CODE 250 W/ 637 OVERRIDE(OP): Performed by: ORAL & MAXILLOFACIAL SURGERY

## 2024-10-28 PROCEDURE — A9270 NON-COVERED ITEM OR SERVICE: HCPCS | Performed by: STUDENT IN AN ORGANIZED HEALTH CARE EDUCATION/TRAINING PROGRAM

## 2024-10-28 PROCEDURE — G0378 HOSPITAL OBSERVATION PER HR: HCPCS

## 2024-10-28 PROCEDURE — A9270 NON-COVERED ITEM OR SERVICE: HCPCS | Performed by: ORAL & MAXILLOFACIAL SURGERY

## 2024-10-28 PROCEDURE — 700105 HCHG RX REV CODE 258: Performed by: STUDENT IN AN ORGANIZED HEALTH CARE EDUCATION/TRAINING PROGRAM

## 2024-10-28 RX ORDER — HYDROXYZINE HYDROCHLORIDE 25 MG/1
25 TABLET, FILM COATED ORAL 3 TIMES DAILY PRN
Status: DISCONTINUED | OUTPATIENT
Start: 2024-10-28 | End: 2024-10-28 | Stop reason: HOSPADM

## 2024-10-28 RX ORDER — OXYCODONE HYDROCHLORIDE 5 MG/1
5 TABLET ORAL EVERY 6 HOURS PRN
Qty: 30 TABLET | Refills: 0 | Status: SHIPPED | OUTPATIENT
Start: 2024-10-28 | End: 2024-11-07

## 2024-10-28 RX ORDER — CHLORHEXIDINE GLUCONATE ORAL RINSE 1.2 MG/ML
15 SOLUTION DENTAL 2 TIMES DAILY
Qty: 210 ML | Refills: 0 | Status: SHIPPED | OUTPATIENT
Start: 2024-10-28 | End: 2024-11-04

## 2024-10-28 RX ADMIN — ACETAMINOPHEN 1000 MG: 500 TABLET ORAL at 05:33

## 2024-10-28 RX ADMIN — KETOROLAC TROMETHAMINE 15 MG: 15 INJECTION, SOLUTION INTRAMUSCULAR; INTRAVENOUS at 13:21

## 2024-10-28 RX ADMIN — AMPICILLIN AND SULBACTAM 3 G: 1; 2 INJECTION, POWDER, FOR SOLUTION INTRAMUSCULAR; INTRAVENOUS at 05:37

## 2024-10-28 RX ADMIN — OXYCODONE HYDROCHLORIDE 10 MG: 10 TABLET ORAL at 08:41

## 2024-10-28 RX ADMIN — ACETAMINOPHEN 1000 MG: 500 TABLET ORAL at 13:21

## 2024-10-28 RX ADMIN — PROCHLORPERAZINE EDISYLATE 10 MG: 5 INJECTION INTRAMUSCULAR; INTRAVENOUS at 08:55

## 2024-10-28 RX ADMIN — AMPICILLIN AND SULBACTAM 3 G: 1; 2 INJECTION, POWDER, FOR SOLUTION INTRAMUSCULAR; INTRAVENOUS at 13:24

## 2024-10-28 RX ADMIN — ONDANSETRON 4 MG: 2 INJECTION INTRAMUSCULAR; INTRAVENOUS at 08:24

## 2024-10-28 RX ADMIN — KETOROLAC TROMETHAMINE 15 MG: 15 INJECTION, SOLUTION INTRAMUSCULAR; INTRAVENOUS at 05:33

## 2024-10-30 LAB
BACTERIA WND AEROBE CULT: ABNORMAL
GRAM STN SPEC: ABNORMAL
SIGNIFICANT IND 70042: ABNORMAL
SITE SITE: ABNORMAL
SOURCE SOURCE: ABNORMAL

## (undated) DEVICE — DRAIN PENROSE STERILE 1/4 X - 18 IN (25EA/BX)

## (undated) DEVICE — TUBING CLEARLINK DUO-VENT - C-FLO (48EA/CA)

## (undated) DEVICE — SENSOR OXIMETER ADULT SPO2 RD SET (20EA/BX)

## (undated) DEVICE — SUCTION INSTRUMENT YANKAUER BULBOUS TIP W/O VENT (50EA/CA)

## (undated) DEVICE — LACTATED RINGERS INJ 1000 ML - (14EA/CA 60CA/PF)

## (undated) DEVICE — GOWN WARMING STANDARD FLEX - (30/CA)

## (undated) DEVICE — BOVIE NEEDLE TIP INSULATD NON-SAFETY 2CM (50/PK)

## (undated) DEVICE — SUTURE 4-0 PLAIN GUT SC-1 ETHICON (36PK/BX)

## (undated) DEVICE — CANISTER SUCTION 3000ML MECHANICAL FILTER AUTO SHUTOFF MEDI-VAC NONSTERILE LF DISP (40EA/CA)

## (undated) DEVICE — SET EXTENSION WITH 2 PORTS (48EA/CA) ***PART #2C8610 IS A SUBSTITUTE*****

## (undated) DEVICE — ELECTRODE DUAL RETURN W/ CORD - (50/PK)

## (undated) DEVICE — JAW BRA #93

## (undated) DEVICE — PACK MINOR BASIN - (2EA/CA)

## (undated) DEVICE — TOOTHBRUSH ADULT (72EA/CA)

## (undated) DEVICE — SUTURE 3-0 CHROMIC GUT SH 27 (36PK/BX)"

## (undated) DEVICE — SYRINGE 10 ML CONTROL LL (25EA/BX 4BX/CA)

## (undated) DEVICE — SODIUM CHL IRRIGATION 0.9% 1000ML (12EA/CA)

## (undated) DEVICE — DRAPE MAGNETIC (INSTRA-MAG) - (30/CA)

## (undated) DEVICE — SET LEADWIRE 5 LEAD BEDSIDE DISPOSABLE ECG (1SET OF 5/EA)

## (undated) DEVICE — PEROXIDE HYDROGEN 3% 32 OZ. (12EA/BX)

## (undated) DEVICE — GLOVE SZ 8 BIOGEL PI MICRO - PF LF (50PR/BX)

## (undated) DEVICE — BLADE SURGICAL #15 - (50/BX 3BX/CA)

## (undated) DEVICE — SYRINGE EAR/NOSE 3 OZ STERILE (50/CA

## (undated) DEVICE — SLEEVE, VASO, THIGH, MED

## (undated) DEVICE — TRAY SRGPRP PVP IOD WT PRP - (20/CA)

## (undated) DEVICE — NEEDLE NON SAFETY 25 GA X 1 1/2 IN HYPO (100EA/BX)

## (undated) DEVICE — HEMOSTAT SURG ABSORBABLE - 4 X 8 IN SURGICEL (24EA/CA)

## (undated) DEVICE — DRAPE SURGICAL U 77X120 - (10/CA)

## (undated) DEVICE — SUTURE GENERAL

## (undated) DEVICE — GLOVE BIOGEL PI INDICATOR SZ 8.5 SURGICAL PF LF - (50PR/BX 4BX/CA)

## (undated) DEVICE — GLOVE SZ 6 BIOGEL PI MICRO - PF LF (50PR/BX 4BX/CA)

## (undated) DEVICE — CATHETER IV 14 GA X 2 ---SURG.& SDS ONLY---(200EA/CA)